# Patient Record
Sex: MALE | Race: WHITE | NOT HISPANIC OR LATINO | Employment: PART TIME | URBAN - NONMETROPOLITAN AREA
[De-identification: names, ages, dates, MRNs, and addresses within clinical notes are randomized per-mention and may not be internally consistent; named-entity substitution may affect disease eponyms.]

---

## 2017-08-10 DIAGNOSIS — J31.0 CHRONIC RHINITIS: ICD-10-CM

## 2017-08-10 DIAGNOSIS — J30.89 PERENNIAL ALLERGIC RHINITIS WITH SEASONAL VARIATION: ICD-10-CM

## 2017-08-10 DIAGNOSIS — J33.9 NASAL POLYP: ICD-10-CM

## 2017-08-10 DIAGNOSIS — J30.2 PERENNIAL ALLERGIC RHINITIS WITH SEASONAL VARIATION: ICD-10-CM

## 2017-08-10 DIAGNOSIS — J30.2 SEASONAL ALLERGIC RHINITIS: ICD-10-CM

## 2017-08-11 RX ORDER — FLUTICASONE PROPIONATE 50 MCG
SPRAY, SUSPENSION (ML) NASAL
Qty: 16 G | Refills: 1 | Status: SHIPPED | OUTPATIENT
Start: 2017-08-11 | End: 2017-09-22

## 2017-09-22 ENCOUNTER — OFFICE VISIT (OUTPATIENT)
Dept: OTOLARYNGOLOGY | Facility: OTHER | Age: 67
End: 2017-09-22
Attending: PHYSICIAN ASSISTANT
Payer: COMMERCIAL

## 2017-09-22 VITALS
TEMPERATURE: 97.8 F | SYSTOLIC BLOOD PRESSURE: 122 MMHG | HEART RATE: 62 BPM | OXYGEN SATURATION: 95 % | BODY MASS INDEX: 23.62 KG/M2 | DIASTOLIC BLOOD PRESSURE: 68 MMHG | WEIGHT: 165 LBS | HEIGHT: 70 IN

## 2017-09-22 DIAGNOSIS — J30.2 PERENNIAL ALLERGIC RHINITIS WITH SEASONAL VARIATION: ICD-10-CM

## 2017-09-22 DIAGNOSIS — J32.9 CHRONIC SINUSITIS, UNSPECIFIED LOCATION: ICD-10-CM

## 2017-09-22 DIAGNOSIS — R05.9 COUGH: ICD-10-CM

## 2017-09-22 DIAGNOSIS — J30.89 PERENNIAL ALLERGIC RHINITIS WITH SEASONAL VARIATION: ICD-10-CM

## 2017-09-22 DIAGNOSIS — J33.9 NASAL POLYP: Primary | ICD-10-CM

## 2017-09-22 DIAGNOSIS — J31.0 CHRONIC RHINITIS, UNSPECIFIED TYPE: ICD-10-CM

## 2017-09-22 DIAGNOSIS — Z98.890 S/P FESS (FUNCTIONAL ENDOSCOPIC SINUS SURGERY): ICD-10-CM

## 2017-09-22 DIAGNOSIS — J30.2 SEASONAL ALLERGIC RHINITIS, UNSPECIFIED CHRONICITY, UNSPECIFIED TRIGGER: ICD-10-CM

## 2017-09-22 PROCEDURE — 99213 OFFICE O/P EST LOW 20 MIN: CPT | Mod: 25 | Performed by: PHYSICIAN ASSISTANT

## 2017-09-22 PROCEDURE — 31231 NASAL ENDOSCOPY DX: CPT | Performed by: PHYSICIAN ASSISTANT

## 2017-09-22 RX ORDER — BUDESONIDE 0.5 MG/2ML
INHALANT ORAL
Qty: 1 BOX | Refills: 1 | Status: SHIPPED | OUTPATIENT
Start: 2017-09-22 | End: 2018-03-12

## 2017-09-22 RX ORDER — FLUTICASONE PROPIONATE 50 MCG
SPRAY, SUSPENSION (ML) NASAL
Qty: 16 G | Refills: 11 | Status: SHIPPED | OUTPATIENT
Start: 2017-09-22 | End: 2018-10-15

## 2017-09-22 RX ORDER — OLOPATADINE HYDROCHLORIDE 665 UG/1
2 SPRAY NASAL EVERY MORNING
Qty: 1 BOTTLE | Refills: 11 | Status: SHIPPED | OUTPATIENT
Start: 2017-09-22 | End: 2018-10-17

## 2017-09-22 ASSESSMENT — PAIN SCALES - GENERAL: PAINLEVEL: NO PAIN (0)

## 2017-09-22 NOTE — PROGRESS NOTES
Chief Complaint   Patient presents with     RECHECK     Nasal Polyp     Patient presents for follow up. He was last seen last fall by Dr. Wells for CRS, nasal polyps, and seasonal/ perennial allergic rhinitis.     Maco was doing well. Tolerated in office polyp removal, right ethmoid. He feels it some irritation.   He has noticed post nasal drip, drainage. He does have coughing fits, related to allergies. This was initially brought him to ENT at the start SLIT therapy.   He has been using lubricating eye drops w/ good relief. He has noticed less irritation.     He has been using Jey med rinse as directed, Flonase, Singulair and Xyzal.     He did d/c Qnasl due to eczema concerns.   He does follow dermatology and considered increasing Xyzal.   History of polypectomy and sinus surgery times 3.     He does have reflux and uses Prilosec PRN.   He las dose was >1 month ago. No dysphagia. No chronic throat clearing. He had some raspy voice concerns. Drinks water daily. No caffeine intake.    Past Medical History:   Diagnosis Date     Clotting disorder (H)     Factor V Leiden, history of DVT and PE     Uncomplicated asthma         Allergies   Allergen Reactions     Aspirin      Food      Apples, nectarines......throat     Current Outpatient Prescriptions   Medication     fluticasone (FLONASE) 50 MCG/ACT spray     montelukast (SINGULAIR) 10 MG tablet     levocetirizine (XYZAL) 5 MG tablet     fluocinonide (LIDEX) 0.05 % cream     Beclomethasone Dipropionate (QNASL) 80 MCG/ACT AERS Nasal Cement     SUBLINGUAL IMMUNOTHERAPY, SLIT,     acetaminophen (TYLENOL) 325 MG tablet     warfarin (COUMADIN) 5 MG tablet     NONFORMULARY     cholecalciferol (VITAMIN D3) 1000 UNIT tablet     albuterol (ALBUTEROL) 108 (90 BASE) MCG/ACT inhaler     Multiple Vitamin (DAILY MULTIVITAMIN PO)     Lutein 6 MG CAPS     Triamcinolone Acetonide (KENALOG EX)     fluticasone (FLOVENT HFA) 220 MCG/ACT inhaler     Flaxseed, Linseed, (FLAXSEED OIL)  "1000 MG CAPS     fish oil-omega-3 fatty acids (FISH OIL) 1000 MG capsule     EPINEPHrine (EPIPEN) 0.3 MG/0.3ML injection     Desoximetasone 0.05 % CREA     No current facility-administered medications for this visit.       ROS: 10 point ROS neg other than the symptoms noted above in the HPI.  /68  Pulse 62  Temp 97.8  F (36.6  C) (Tympanic)  Ht 5' 10\" (1.778 m)  Wt 165 lb (74.8 kg)  SpO2 95%  BMI 23.68 kg/m2  General - The patient is well nourished and well developed, and appears to have good nutritional status.  Alert and oriented to person and place, interactive.  Head and Face - Normocephalic and atraumatic, with no gross asymmetry noted of the contour of the facial features.  The facial nerve is intact, with strong symmetric movements.  Neck-no palpable lymphadenopathy or thyroid mass.  Trachea is midline.  Eyes - Extraocular movements intact.   Ears- External auditory canals are patent, tympanic membranes are intact without effusion or worrisome retractions   Nose - Nasal mucosa is pink and moist with no abnormal mucus.  The septum was  non-obstructive, turbinates of normal size and position.  No polyps, masses, or purulence noted on examination.  Mouth - Examination of the oral cavity shows pink, healthy, moist mucosa.  No lesions or ulceration noted.  The dentition are in good repair.  The tongue is mobile and midline.  Throat - The walls of the oropharynx were smooth, pink, moist, symmetric, and had no lesions or ulcerations.  The tonsillar pillars and soft palate were symmetric.  The uvula was midline on elevation.       To evaluate the nose in the postoperative state I performed rigid nasal endoscopy.  I first sprayed with lidocaine and neosynephrine.  I began with the LEFT side using a 2.7mm 30 degree rigid nasal endoscope.  The middle meatus was open, and I was able to pass the scope through.  The LEFT maxillary antrostomy is open, and looking down and into the LEFT maxillary sinus, the " mucosa looks healthy, no abnormal secretions.  Going further back, the ethmoid roof is nicely re-mucosalized, and there is no abnormal secretions.  Left polypoid change.      The right side was then examined.  The middle meatus was open and I visualized the RIGHT antrostomy was open.  Looking down into the RIGHT maxillary sinus, the mucosa was flat and healthy in appearance.  Going further back the ethmoid system looks good aside from polypoid change, mild to medial MT. No significant polyp.     ASSESSMENT:    ICD-10-CM    1. Nasal polyp J33.9 budesonide (PULMICORT) 0.5 MG/2ML neb solution     olopatadine (PATANASE) 0.6 % nasal spray     fluticasone (FLONASE) 50 MCG/ACT spray   2. Chronic sinusitis, unspecified location J32.9 budesonide (PULMICORT) 0.5 MG/2ML neb solution     olopatadine (PATANASE) 0.6 % nasal spray   3. S/P distant FESS (functional endoscopic sinus surgery) Z98.890 budesonide (PULMICORT) 0.5 MG/2ML neb solution     olopatadine (PATANASE) 0.6 % nasal spray   4. Chronic rhinitis, unspecified type J31.0 fluticasone (FLONASE) 50 MCG/ACT spray   5. Seasonal allergic rhinitis, unspecified chronicity, unspecified trigger J30.2 fluticasone (FLONASE) 50 MCG/ACT spray   6. Cough R05    7. Perennial allergic rhinitis with seasonal variation J30.89 fluticasone (FLONASE) 50 MCG/ACT spray    J30.2    Use high volume hortensia med saline irrigation. For 4 weeks use Budesonide vial in Hortensia Med rinse.    Use warm distilled water and 2 packets of the salt solution that comes with the bottle, dissolve in bottle up to 240 ml mercedez.  Irrigate each side of your nose leaning over the sink, using 1/3 to 1/2 the volume of the bottle in each nostril every irrigation.  Irrigate 5 times daily and as needed.         Use Hortensia Med w/ Budesonide solution for 4 weeks. (2 times a day )  Continue w/ Flonase use 2 sprays to each nostril daily in PM.   Start Patanase 2 sprays to each nostril in AM.     Continue with Singulair  May use Xyzal  daily or as needed  Consider repeat MQT  Follow LPR precautions.   Consider flex scope if there is no improvement.     Kathy Perdue PA-C  ENT  [facility], [place]  [phone]

## 2017-09-22 NOTE — PATIENT INSTRUCTIONS
Use high volume hortensia med saline irrigation. For 4 weeks use Budesonide vial in Hortensia Med rinse.    Use warm distilled water and 2 packets of the salt solution that comes with the bottle, dissolve in bottle up to 240 ml mercedez.  Irrigate each side of your nose leaning over the sink, using 1/3 to 1/2 the volume of the bottle in each nostril every irrigation.  Irrigate 5 times daily and as needed.         Use Hortensia Med w/ Budesonide solution for 4 weeks. (2 times a day )  Continue w/ Flonase use 2 sprays to each nostril daily in PM.   Start Patanase 2 sprays to each nostril in AM.     Continue with Singulair  May use Xyzal daily or as needed    If there are concerns or questions, Call [phone] and ask for nurse    Adult lifestyle changes to prevent LPR reviewed      Avoid eating and drinking within two to three hours prior to bedtime    Do not drink alcohol    Eat small meals and slowly    Limit problem foods:    o Caffeine  o Carbonated drinks  o Chocolate  o Peppermint  o Tomato  o Citrus fruits  o Fatty and fried foods      Lose weight    Quit smoking    Wear loose clothing

## 2017-09-22 NOTE — NURSING NOTE
"Chief Complaint   Patient presents with     RECHECK     Nasal Polyp       Initial /68  Pulse 62  Temp 97.8  F (36.6  C) (Tympanic)  Ht 5' 10\" (1.778 m)  Wt 165 lb (74.8 kg)  SpO2 95%  BMI 23.68 kg/m2 Estimated body mass index is 23.68 kg/(m^2) as calculated from the following:    Height as of this encounter: 5' 10\" (1.778 m).    Weight as of this encounter: 165 lb (74.8 kg).  Medication Reconciliation: complete     Danii Merritt      "

## 2017-09-22 NOTE — MR AVS SNAPSHOT
After Visit Summary   9/22/2017    Maco Benavidez    MRN: [id number]           Patient Information     Date Of Birth          1950        Visit Information        Provider Department      9/22/2017 2:00 PM Kathy Perdue PA-C [facility]        Today's Diagnoses     Nasal polyp    -  1    Chronic sinusitis, unspecified location        S/P distant FESS (functional endoscopic sinus surgery)        Chronic rhinitis, unspecified type        Seasonal allergic rhinitis, unspecified chronicity, unspecified trigger        Cough        Perennial allergic rhinitis with seasonal variation          Care Instructions    Use high volume hortensia med saline irrigation. For 4 weeks use Budesonide vial in Hortensia Med rinse.    Use warm distilled water and 2 packets of the salt solution that comes with the bottle, dissolve in bottle up to 240 ml mercedez.  Irrigate each side of your nose leaning over the sink, using 1/3 to 1/2 the volume of the bottle in each nostril every irrigation.  Irrigate 5 times daily and as needed.         Use Hortensia Med w/ Budesonide solution for 4 weeks. (2 times a day )  Continue w/ Flonase use 2 sprays to each nostril daily in PM.   Start Patanase 2 sprays to each nostril in AM.     Continue with Singulair  May use Xyzal daily or as needed    If there are concerns or questions, Call [phone] and ask for nurse    Adult lifestyle changes to prevent LPR reviewed      Avoid eating and drinking within two to three hours prior to bedtime    Do not drink alcohol    Eat small meals and slowly    Limit problem foods:    o Caffeine  o Carbonated drinks  o Chocolate  o Peppermint  o Tomato  o Citrus fruits  o Fatty and fried foods      Lose weight    Quit smoking    Wear loose clothing            Follow-ups after your visit        Follow-up notes from your care team     Return in about 5 weeks (around 10/27/2017).      Who to contact     If you have questions or need follow up information about  "today's clinic visit or your schedule please contact [facility] directly at [phone].  Normal or non-critical lab and imaging results will be communicated to you by MyChart, letter or phone within 4 business days after the clinic has received the results. If you do not hear from us within 7 days, please contact the clinic through MyChart or phone. If you have a critical or abnormal lab result, we will notify you by phone as soon as possible.  Submit refill requests through [employer] or call your pharmacy and they will forward the refill request to us. Please allow 3 business days for your refill to be completed.          Additional Information About Your Visit        MyChart Information     [employer] lets you send messages to your doctor, view your test results, renew your prescriptions, schedule appointments and more. To sign up, go to [email or web address] . Click on \"Log in\" on the left side of the screen, which will take you to the Welcome page. Then click on \"Sign up Now\" on the right side of the page.     You will be asked to enter the access code listed below, as well as some personal information. Please follow the directions to create your username and password.     Your access code is: [email or web address]  Expires: 2017  2:23 PM     Your access code will  in 90 days. If you need help or a new code, please call your [place] clinic or [phone].        Care EveryWhere ID     This is your Care EveryWhere ID. This could be used by other organizations to access your [place] medical records  [id number]        Your Vitals Were     Pulse Temperature Height Pulse Oximetry BMI (Body Mass Index)       62 97.8  F (36.6  C) (Tympanic) 5' 10\" (1.778 m) 95% 23.68 kg/m2        Blood Pressure from Last 3 Encounters:   17 122/68   10/17/16 127/68   16 138/62    Weight from Last 3 Encounters:   17 165 lb (74.8 kg)   10/17/16 165 lb (74.8 kg)   16 168 lb (76.2 kg)            "   Today, you had the following     No orders found for display         Today's Medication Changes          These changes are accurate as of: 9/22/17  2:23 PM.  If you have any questions, ask your nurse or doctor.               Start taking these medicines.        Dose/Directions    budesonide 0.5 MG/2ML neb solution   Commonly known as:  PULMICORT   Used for:  Nasal polyp, S/P FESS (functional endoscopic sinus surgery), Chronic sinusitis, unspecified location   Started by:  Kathy Perdue PA-C        Make 240 cc Jey med sinus irrigation Mix 2 ml vial of budesonide 0.5 mg Rinse BID for 4 weeks   Quantity:  1 Box   Refills:  1       olopatadine 0.6 % nasal spray   Commonly known as:  PATANASE   Used for:  Nasal polyp, Chronic sinusitis, unspecified location, S/P FESS (functional endoscopic sinus surgery)   Started by:  Kathy Perdue PA-C        Dose:  2 spray   Spray 2 sprays into both nostrils every morning   Quantity:  1 Bottle   Refills:  11         These medicines have changed or have updated prescriptions.        Dose/Directions    fluticasone 50 MCG/ACT spray   Commonly known as:  FLONASE   This may have changed:  See the new instructions.   Used for:  Seasonal allergic rhinitis, unspecified chronicity, unspecified trigger, Perennial allergic rhinitis with seasonal variation, Chronic rhinitis, unspecified type, Nasal polyp   Changed by:  Kathy Perdue PA-C        2 SPRAYS TO EACH NOSTRIL DAILY   Quantity:  16 g   Refills:  11            Where to get your medicines      These medications were sent to [facility] [address] [address]     Phone:  [phone]     budesonide 0.5 MG/2ML neb solution    fluticasone 50 MCG/ACT spray    olopatadine 0.6 % nasal spray                Primary Care Provider Office Phone # Fax #    Jc Reardon [phone]       [address] [address]        Equal Access to Services      EPHRAIM PINEDA : Hadii aad ku shanthi Carroll, waaxda luqadaha, qaybta kaalmada dayana, emmanuelle dimple haytodd tomlinsoncarolynrayna mary . So [facility] [phone].    ATENCIÓN: Si booker washburn, tiene a alvarado disposición servicios gratuitos de asistencia lingüística. Llame al [phone].    We comply with applicable federal civil rights laws and [place] laws. We do not discriminate on the basis of race, color, national origin, age, disability sex, sexual orientation or gender identity.            Thank you!     Thank you for choosing [facility]  for your [facility]. Our goal is always to provide you with excellent care. Hearing back from our patients is one way we can continue to improve our services. Please take a few minutes to complete the written survey that you may receive in the mail after your visit with us. Thank you!             Your Updated Medication List - Protect others around you: Learn how to safely use, store and throw away your medicines at www.disposemymeds.org.          This list is accurate as of: 9/22/17  2:23 PM.  Always use your most recent med list.                   Brand Name Dispense Instructions for use Diagnosis    acetaminophen 325 MG tablet    TYLENOL    100 tablet    Take 2 tablets (650 mg) by mouth every 4 hours as needed for mild pain or fever    Cellulitis       Beclomethasone Dipropionate 80 MCG/ACT Aers Nasal Spray    QNASL    17 g    Spray 2 sprays into both nostrils 2 times daily    Nasal polyp, Perennial allergic rhinitis with seasonal variation, Chronic rhinitis, Allergic conjunctivitis of both eyes       budesonide 0.5 MG/2ML neb solution    PULMICORT    1 Box    Make 240 cc Jey med sinus irrigation Mix 2 ml vial of budesonide 0.5 mg Rinse BID for 4 weeks    Nasal polyp, S/P FESS (functional endoscopic sinus surgery), Chronic sinusitis, unspecified location       cholecalciferol 1000 UNIT tablet    vitamin D     Take 1 tablet by mouth daily.        COUMADIN 5 MG tablet    Generic drug:  warfarin     30 tablet    Take 1 tablet (5 mg) by mouth daily [facility]        DAILY MULTIVITAMIN PO      Take 1 tablet by mouth daily with food.        Desoximetasone 0.05 % Crea      Apply  topically 2 times daily. (a thin layer to the affected area(s); rub in gently and completely)        EPIPEN 0.3 MG/0.3ML injection   Generic drug:  EPINEPHrine      Inject 0.3 mg into the muscle once as needed.        fish oil-omega-3 fatty acids 1000 MG capsule      Take 3 capsules by mouth daily.        flaxseed oil 1000 MG Caps      Take 1 capsule by mouth 3 times daily.        FLOVENT  MCG/ACT Inhaler   Generic drug:  fluticasone      Take 1 puff by mouth 2 times daily.        fluocinonide 0.05 % cream    LIDEX     Apply topically 2 times daily Apply two times daily to eczema patches on hands and body. Not to face.        fluticasone 50 MCG/ACT spray    FLONASE    16 g    2 SPRAYS TO EACH NOSTRIL DAILY    Seasonal allergic rhinitis, unspecified chronicity, unspecified trigger, Perennial allergic rhinitis with seasonal variation, Chronic rhinitis, unspecified type, Nasal polyp       KENALOG EX      as needed.        levocetirizine 5 MG tablet    XYZAL    30 tablet    TAKE 1 TABLET DAILY IN THE EVENING.    Seasonal allergic rhinitis, Perennial allergic rhinitis with seasonal variation, Chronic rhinitis, Allergic conjunctivitis of both eyes       Lutein 6 MG Caps      Take 1 capsule by mouth daily.        montelukast 10 MG tablet    SINGULAIR    30 tablet    TAKE ONE TABLET AT BEDTIME.    Seasonal allergic rhinitis, Perennial allergic rhinitis with seasonal variation, Chronic rhinitis, Allergic conjunctivitis of both eyes       NONFORMULARY      Allergy drops 1 drop under tongue three times a day.        olopatadine 0.6 % nasal spray    PATANASE    1 Bottle    Spray 2 sprays into both nostrils every morning    Nasal polyp, Chronic sinusitis, unspecified location, S/P FESS (functional  endoscopic sinus surgery)       PROAIR  (90 BASE) MCG/ACT Inhaler   Generic drug:  albuterol      Inhale 1 puff into the lungs as needed        SUBLINGUAL IMMUNOTHERAPY (SLIT)     1 Bottle    Continue with SLIT.  Maintain TID dosing.    Seasonal allergic rhinitis

## 2017-11-03 ENCOUNTER — OFFICE VISIT (OUTPATIENT)
Dept: OTOLARYNGOLOGY | Facility: OTHER | Age: 67
End: 2017-11-03
Attending: PHYSICIAN ASSISTANT
Payer: COMMERCIAL

## 2017-11-03 VITALS
WEIGHT: 160 LBS | DIASTOLIC BLOOD PRESSURE: 74 MMHG | SYSTOLIC BLOOD PRESSURE: 126 MMHG | OXYGEN SATURATION: 91 % | TEMPERATURE: 97.4 F | BODY MASS INDEX: 22.9 KG/M2 | HEART RATE: 64 BPM | HEIGHT: 70 IN

## 2017-11-03 DIAGNOSIS — J32.9 CHRONIC SINUSITIS, UNSPECIFIED LOCATION: ICD-10-CM

## 2017-11-03 DIAGNOSIS — K13.6 IRRITATION OF ORAL CAVITY: ICD-10-CM

## 2017-11-03 DIAGNOSIS — J33.9 NASAL POLYP: Primary | ICD-10-CM

## 2017-11-03 DIAGNOSIS — R05.9 COUGH: ICD-10-CM

## 2017-11-03 DIAGNOSIS — Z98.890 S/P FESS (FUNCTIONAL ENDOSCOPIC SINUS SURGERY): ICD-10-CM

## 2017-11-03 DIAGNOSIS — J20.9 ACUTE BRONCHITIS, UNSPECIFIED ORGANISM: ICD-10-CM

## 2017-11-03 DIAGNOSIS — J45.909 UNCOMPLICATED ASTHMA, UNSPECIFIED ASTHMA SEVERITY, UNSPECIFIED WHETHER PERSISTENT: ICD-10-CM

## 2017-11-03 PROCEDURE — 99213 OFFICE O/P EST LOW 20 MIN: CPT | Mod: 25 | Performed by: PHYSICIAN ASSISTANT

## 2017-11-03 PROCEDURE — 31575 DIAGNOSTIC LARYNGOSCOPY: CPT | Performed by: PHYSICIAN ASSISTANT

## 2017-11-03 RX ORDER — LEVOFLOXACIN 500 MG/1
500 TABLET, FILM COATED ORAL DAILY
Qty: 10 TABLET | Refills: 0 | Status: SHIPPED | OUTPATIENT
Start: 2017-11-03 | End: 2018-03-12

## 2017-11-03 RX ORDER — CEPHALEXIN 500 MG/1
500 CAPSULE ORAL 3 TIMES DAILY
Qty: 30 CAPSULE | Refills: 0 | Status: SHIPPED | OUTPATIENT
Start: 2017-11-03 | End: 2017-11-03 | Stop reason: ALTCHOICE

## 2017-11-03 ASSESSMENT — PAIN SCALES - GENERAL: PAINLEVEL: NO PAIN (0)

## 2017-11-03 NOTE — NURSING NOTE
"Chief Complaint   Patient presents with     RECHECK     Follow Up Nasal Polyp, Chronic Sinusitis, Seasonal Allergic Rhinitis, Cough, Perennial Allergic Rhintitis       Initial /74 (BP Location: Left arm, Patient Position: Sitting, Cuff Size: Adult Regular)  Pulse 64  Temp 97.4  F (36.3  C) (Tympanic)  Ht 5' 10\" (1.778 m)  Wt 160 lb (72.6 kg)  SpO2 91%  BMI 22.96 kg/m2 Estimated body mass index is 22.96 kg/(m^2) as calculated from the following:    Height as of this encounter: 5' 10\" (1.778 m).    Weight as of this encounter: 160 lb (72.6 kg).  Medication Reconciliation: leatha Parish      "

## 2017-11-03 NOTE — PROGRESS NOTES
Chief Complaint   Patient presents with     RECHECK     Follow Up Nasal Polyp, Chronic Sinusitis, Seasonal Allergic Rhinitis, Cough, Perennial Allergic Rhintitis     Maco returns for recheck from his last visit. His sinuses are feeling better, and notes less pressure. However, drainage remains and may have worsened. He had felt cough improved after his last visit, but returned. He now feels increase in cough, chest congestion, generally not feeling well, fatigued. He does have asthma and uses his Flovent, Albuterol as directed. Recent increase use of Albuterol.   Maco feels he is not able to expel his secretions or cough remove debris.   He has slight throat irritation as well.     He completed Budesonide rinse and returned to use of Jey Med.     Past Medical History:   Diagnosis Date     Clotting disorder (H)     Factor V Leiden, history of DVT and PE     Uncomplicated asthma         Allergies   Allergen Reactions     Aspirin      Food      Apples, nectarines......throat     Current Outpatient Prescriptions   Medication     budesonide (PULMICORT) 0.5 MG/2ML neb solution     olopatadine (PATANASE) 0.6 % nasal spray     fluticasone (FLONASE) 50 MCG/ACT spray     montelukast (SINGULAIR) 10 MG tablet     levocetirizine (XYZAL) 5 MG tablet     fluocinonide (LIDEX) 0.05 % cream     acetaminophen (TYLENOL) 325 MG tablet     warfarin (COUMADIN) 5 MG tablet     NONFORMULARY     cholecalciferol (VITAMIN D3) 1000 UNIT tablet     albuterol (ALBUTEROL) 108 (90 BASE) MCG/ACT inhaler     Multiple Vitamin (DAILY MULTIVITAMIN PO)     Lutein 6 MG CAPS     Triamcinolone Acetonide (KENALOG EX)     fluticasone (FLOVENT HFA) 220 MCG/ACT inhaler     Flaxseed, Linseed, (FLAXSEED OIL) 1000 MG CAPS     fish oil-omega-3 fatty acids (FISH OIL) 1000 MG capsule     EPINEPHrine (EPIPEN) 0.3 MG/0.3ML injection     Desoximetasone 0.05 % CREA     No current facility-administered medications for this visit.       ROS: 10 point ROS neg other than  "the symptoms noted above in the HPI.  /74 (BP Location: Left arm, Patient Position: Sitting, Cuff Size: Adult Regular)  Pulse 64  Temp 97.4  F (36.3  C) (Tympanic)  Ht 5' 10\" (1.778 m)  Wt 160 lb (72.6 kg)  SpO2 91%  BMI 22.96 kg/m2  General - The patient is well nourished and well developed, and appears to have good nutritional status.  Alert and oriented to person and place, interactive.  Head and Face - Normocephalic and atraumatic, with no gross asymmetry noted of the contour of the facial features.  The facial nerve is intact, with strong symmetric movements.  Neck-no palpable lymphadenopathy or thyroid mass.  Trachea is midline.  Eyes - Extraocular movements intact.   Ears- External auditory canals are patent, tympanic membranes are intact without effusion or worrisome retractions   Nose - Nasal mucosa is pink and moist with no abnormal mucus.  The septum was  non-obstructive, turbinates of normal size and position.  No polyps, masses, or purulence noted on examination.  Mouth - Examination of the oral cavity shows pink, healthy, moist mucosa.  No lesions or ulceration noted.  The dentition are in good repair.  The tongue is mobile and midline.  Throat - The walls of the oropharynx were smooth, pink, moist, symmetric, and had no lesions or ulcerations.  The tonsillar pillars and soft palate were symmetric.  The uvula was midline on elevation.  Left tonsillar fossa, posterior pharynx with white debris. (c/w use of inhalers)  Heart- Regular  Lungs- Right with scattered rales, expiratory wheeze. Left w/ expiratory wheeze.       To evaluate the nose in the postoperative state I performed rigid nasal endoscopy.  I first sprayed with lidocaine and neosynephrine.  I began with the LEFT side using a 2.7mm 30 degree rigid nasal endoscope.  The middle meatus was open, and I was able to pass the scope through.  The LEFT maxillary antrostomy is open, and looking down and into the LEFT maxillary sinus, the " mucosa looks healthy, no abnormal secretions.  Going further back, the ethmoid roof is nicely re-mucosalized, and there is no abnormal secretions.  Left polypoid change.       The right side was then examined.  The middle meatus was open and I visualized the RIGHT antrostomy was open.  Looking down into the RIGHT maxillary sinus, the mucosa was flat and healthy in appearance.  Going further back the ethmoid system looks good aside from polypoid change, mild to medial MT. No significant polyp.     Sinuses overall looks better.       Flexible Endoscopy -     Attempts at mirror laryngoscopy were not possible due to gag reflex.  Therefore I proceeded with a fiberoptic examination.  First I sprayed both sides of the nose with a mixture of lidocaine and neosynephrine.  I then passed the scope through the nasal cavity.   The nasal cavity was unremarkable.  The nasopharynx was mucosally covered and symmetric.  The Eustachian tube openings were unobstructed.  Going further down I had a clear view of the base of tongue which had normal appearing lingual tonsillar tissue.  The base of tongue was free of lesions, and the vallecula was open.  The epiglottis was smooth and mucosally covered.  The supraglottic larynx was then clearly visualized.  The vocal cords moved smoothly and symmetrically, they were pearly white and no lesions were seen.  The pyriform sinuses were open, and the limited view of the postcricoid region did not show any lesions.      ASSESSMENT:    ICD-10-CM    1. Nasal polyp J33.9    2. Chronic sinusitis, unspecified location J32.9    3. S/P distant FESS (functional endoscopic sinus surgery) Z98.890    4. Cough R05 levofloxacin (LEVAQUIN) 500 MG tablet     DISCONTINUED: cephALEXin (KEFLEX) 500 MG capsule   5. Uncomplicated asthma, unspecified asthma severity, unspecified whether persistent J45.909    6. Acute bronchitis, unspecified organism J20.9 levofloxacin (LEVAQUIN) 500 MG tablet     DISCONTINUED:  cephALEXin (KEFLEX) 500 MG capsule   7. Irritation of oral cavity K13.6 MAGIC MOUTHWASH, ENTER INGREDIENTS IN COMMENTS,         Patient does have concerns for developing bronchitis. Start Levaquin as directed. He will notify [facility] as he was started on Levaquin.   He verbalizes understanding.     Follow up w/ PCP for recheck of asthma/ bronchitis.     His sinuses are improving, which could be a cause of his increased drainage/ mucous.     Use warm distilled water and 2 packets of the salt solution that comes with the bottle, dissolve in bottle up to 240 ml mercedez.  Irrigate each side of your nose leaning over the sink, using 1/3 to 1/2 the volume of the bottle in each nostril every irrigation.  Irrigate 5 times daily and as needed.  Continue with patanase, Flonase   Continue with Singulair  May use Xyzal daily or as needed  Continue with SLIT  Consider repeat MQT.       Follow LPR precautions.   Start Magic mouthwash as directed.   Gargle after inhaler use  Nurse will contact Maco, he may need to start po Prilosec daily      Adult lifestyle changes to prevent LPR reviewed      Avoid eating and drinking within two to three hours prior to bedtime    Do not drink alcohol    Eat small meals and slowly    Limit problem foods:    o Caffeine  o Carbonated drinks  o Chocolate  o Peppermint  o Tomato  o Citrus fruits  o Fatty and fried foods      Lose weight    Quit smoking    Wear loose clothing    He agrees with this plan.     Kathy Perdue PA-C  [facility], [place]  [phone]

## 2017-11-03 NOTE — MR AVS SNAPSHOT
After Visit Summary   11/3/2017    Maco Benavidez    MRN: [id number]           Patient Information     Date Of Birth          1950        Visit Information        Provider Department      11/3/2017 9:45 AM Kathy Perdue PA-C [facility]        Today's Diagnoses     Nasal polyp    -  1    Chronic sinusitis, unspecified location        S/P distant FESS (functional endoscopic sinus surgery)        Cough        Uncomplicated asthma, unspecified asthma severity, unspecified whether persistent        Acute bronchitis, unspecified organism        Irritation of oral cavity          Care Instructions    Use warm distilled water and 2 packets of the salt solution that comes with the bottle, dissolve in bottle up to 240 ml mercedez.  Irrigate each side of your nose leaning over the sink, using 1/3 to 1/2 the volume of the bottle in each nostril every irrigation.  Irrigate 5 times daily and as needed.  Continue with patanase, Flonase   Continue with Singulair  May use Xyzal daily or as needed  Consider repeat MQT.   Follow LPR precautions.   Start Magic mouthwash as directed. Gargle after inhaler use  Start Levaquin 500 mg one tablet daily for 10 days. Contact coumadin clinic and notify starting antibiotic.     If there are concerns or questions, Call [phone] and ask for nurse     Adult lifestyle changes to prevent LPR reviewed      Avoid eating and drinking within two to three hours prior to bedtime    Do not drink alcohol    Eat small meals and slowly    Limit problem foods:    o Caffeine  o Carbonated drinks  o Chocolate  o Peppermint  o Tomato  o Citrus fruits  o Fatty and fried foods      Lose weight    Quit smoking    Wear loose clothing            Follow-ups after your visit        Who to contact     If you have questions or need follow up information about today's clinic visit or your schedule please contact [facility] directly at [phone].  Normal or non-critical lab and  "imaging results will be communicated to you by MyChart, letter or phone within 4 business days after the clinic has received the results. If you do not hear from us within 7 days, please contact the clinic through [employer] or phone. If you have a critical or abnormal lab result, we will notify you by phone as soon as possible.  Submit refill requests through [employer] or call your pharmacy and they will forward the refill request to us. Please allow 3 business days for your refill to be completed.          Additional Information About Your Visit        [employer] Information     PrintToPeer lets you send messages to your doctor, view your test results, renew your prescriptions, schedule appointments and more. To sign up, go to [email or web address] . Click on \"Log in\" on the left side of the screen, which will take you to the Welcome page. Then click on \"Sign up Now\" on the right side of the page.     You will be asked to enter the access code listed below, as well as some personal information. Please follow the directions to create your username and password.     Your access code is: [email or web address]  Expires: 2017  2:23 PM     Your access code will  in 90 days. If you need help or a new code, please call your [facility] clinic or [phone].        Care EveryWhere ID     This is your Care EveryWhere ID. This could be used by other organizations to access your [facility] medical records  [id number]        Your Vitals Were     Pulse Temperature Height Pulse Oximetry BMI (Body Mass Index)       64 97.4  F (36.3  C) (Tympanic) 5' 10\" (1.778 m) 91% 22.96 kg/m2        Blood Pressure from Last 3 Encounters:   17 126/74   17 122/68   10/17/16 127/68    Weight from Last 3 Encounters:   17 160 lb (72.6 kg)   17 165 lb (74.8 kg)   10/17/16 165 lb (74.8 kg)              Today, you had the following     No orders found for display         Today's Medication Changes          These changes are accurate as " of: 11/3/17 10:17 AM.  If you have any questions, ask your nurse or doctor.               Start taking these medicines.        Dose/Directions    levofloxacin 500 MG tablet   Commonly known as:  LEVAQUIN   Used for:  Acute bronchitis, unspecified organism, Cough   Started by:  Kathy Perdue PA-C        Dose:  500 mg   Take 1 tablet (500 mg) by mouth daily   Quantity:  10 tablet   Refills:  0       MAGIC MOUTHWASH (ENTER INGREDIENTS IN COMMENTS)   Used for:  Irritation of oral cavity   Started by:  Kathy Perdue PA-C        Dose:  10 mL   Take 10 mLs by mouth every 4 hours as needed   Quantity:  240 mL   Refills:  1            Where to get your medicines      These medications were sent to [facility] [place] [id number] [address]     Phone:  [phone]     levofloxacin 500 MG tablet         Some of these will need a paper prescription and others can be bought over the counter.  Ask your nurse if you have questions.     Bring a paper prescription for each of these medications     MAGIC MOUTHWASH (ENTER INGREDIENTS IN COMMENTS)                Primary Care Provider Office Phone # Fax #    Jc Reardon [phone]       [facility] [address] [place] [address]        Equal Access to Services     EPHRAIM PINEDA AH: Hadii mick mcarthur hadasho Soomaali, waaxda luqadaha, qaybta kaalmada adeegyada, emmanuelle chase. So [facility] [phone].    ATENCIÓN: Si habla español, tiene a alvarado disposición servicios gratuitos de asistencia lingüística. Llame al [phone].    We comply with applicable federal civil rights laws and [place] laws. We do not discriminate on the basis of race, color, national origin, age, disability, sex, sexual orientation, or gender identity.            Thank you!     Thank you for choosing [facility]  for your care. Our goal is always to provide you with excellent care. Hearing back from  our patients is one way we can continue to improve our services. Please take a few minutes to complete the written survey that you may receive in the mail after your visit with us. Thank you!             Your Updated Medication List - Protect others around you: Learn how to safely use, store and throw away your medicines at www.disposemymeds.org.          This list is accurate as of: 11/3/17 10:17 AM.  Always use your most recent med list.                   Brand Name Dispense Instructions for use Diagnosis    acetaminophen 325 MG tablet    TYLENOL    100 tablet    Take 2 tablets (650 mg) by mouth every 4 hours as needed for mild pain or fever    Cellulitis       budesonide 0.5 MG/2ML neb solution    PULMICORT    1 Box    Make 240 cc Jey med sinus irrigation Mix 2 ml vial of budesonide 0.5 mg Rinse BID for 4 weeks    Nasal polyp, S/P FESS (functional endoscopic sinus surgery), Chronic sinusitis, unspecified location       cholecalciferol 1000 UNIT tablet    vitamin D3     Take 1 tablet by mouth daily.        COUMADIN 5 MG tablet   Generic drug:  warfarin     30 tablet    Take 1 tablet (5 mg) by mouth daily Per [facility]mad[facility] Clinic        DAILY MULTIVITAMIN PO      Take 1 tablet by mouth daily with food.        Desoximetasone 0.05 % Crea      Apply  topically 2 times daily. (a thin layer to the affected area(s); rub in gently and completely)        EPIPEN 0.3 MG/0.3ML injection   Generic drug:  EPINEPHrine      Inject 0.3 mg into the muscle once as needed.        fish oil-omega-3 fatty acids 1000 MG capsule      Take 3 capsules by mouth daily.        flaxseed oil 1000 MG Caps      Take 1 capsule by mouth 3 times daily.        FLOVENT  MCG/ACT Inhaler   Generic drug:  fluticasone      Take 1 puff by mouth 2 times daily.        fluocinonide 0.05 % cream    LIDEX     Apply topically 2 times daily Apply two times daily to eczema patches on hands and body. Not to face.        fluticasone 50 MCG/ACT spray    FLONASE     16 g    2 SPRAYS TO EACH NOSTRIL DAILY    Seasonal allergic rhinitis, unspecified chronicity, unspecified trigger, Perennial allergic rhinitis with seasonal variation, Chronic rhinitis, unspecified type, Nasal polyp       KENALOG EX      as needed.        levocetirizine 5 MG tablet    XYZAL    30 tablet    TAKE 1 TABLET DAILY IN THE EVENING.    Seasonal allergic rhinitis, Perennial allergic rhinitis with seasonal variation, Chronic rhinitis, Allergic conjunctivitis of both eyes       levofloxacin 500 MG tablet    LEVAQUIN    10 tablet    Take 1 tablet (500 mg) by mouth daily    Acute bronchitis, unspecified organism, Cough       Lutein 6 MG Caps      Take 1 capsule by mouth daily.        MAGIC MOUTHWASH (ENTER INGREDIENTS IN COMMENTS)     240 mL    Take 10 mLs by mouth every 4 hours as needed    Irritation of oral cavity       montelukast 10 MG tablet    SINGULAIR    30 tablet    TAKE ONE TABLET AT BEDTIME.    Seasonal allergic rhinitis, Perennial allergic rhinitis with seasonal variation, Chronic rhinitis, Allergic conjunctivitis of both eyes       NONFORMULARY      Allergy drops 1 drop under tongue three times a day.        olopatadine 0.6 % nasal spray    PATANASE    1 Bottle    Spray 2 sprays into both nostrils every morning    Nasal polyp, Chronic sinusitis, unspecified location, S/P FESS (functional endoscopic sinus surgery)       PROAIR  (90 BASE) MCG/ACT Inhaler   Generic drug:  albuterol      Inhale 1 puff into the lungs as needed

## 2017-11-03 NOTE — PATIENT INSTRUCTIONS
Use warm distilled water and 2 packets of the salt solution that comes with the bottle, dissolve in bottle up to 240 ml mercedez.  Irrigate each side of your nose leaning over the sink, using 1/3 to 1/2 the volume of the bottle in each nostril every irrigation.  Irrigate 5 times daily and as needed.  Continue with patanase, Flonase   Continue with Singulair  May use Xyzal daily or as needed  Consider repeat MQT.   Follow LPR precautions.   Start Magic mouthwash as directed. Gargle after inhaler use  Start Levaquin 500 mg one tablet daily for 10 days. Contact coumadin clinic and notify starting antibiotic.     If there are concerns or questions, Call [phone] and ask for nurse     Adult lifestyle changes to prevent LPR reviewed      Avoid eating and drinking within two to three hours prior to bedtime    Do not drink alcohol    Eat small meals and slowly    Limit problem foods:    o Caffeine  o Carbonated drinks  o Chocolate  o Peppermint  o Tomato  o Citrus fruits  o Fatty and fried foods      Lose weight    Quit smoking    Wear loose clothing

## 2017-11-16 DIAGNOSIS — J30.2 PERENNIAL ALLERGIC RHINITIS WITH SEASONAL VARIATION: ICD-10-CM

## 2017-11-16 DIAGNOSIS — J31.0 CHRONIC RHINITIS: ICD-10-CM

## 2017-11-16 DIAGNOSIS — H10.13 ALLERGIC CONJUNCTIVITIS OF BOTH EYES: ICD-10-CM

## 2017-11-16 DIAGNOSIS — J30.89 PERENNIAL ALLERGIC RHINITIS WITH SEASONAL VARIATION: ICD-10-CM

## 2017-11-16 DIAGNOSIS — J30.2 SEASONAL ALLERGIC RHINITIS: ICD-10-CM

## 2017-11-17 RX ORDER — MONTELUKAST SODIUM 10 MG/1
TABLET ORAL
Qty: 30 TABLET | Refills: 10 | Status: SHIPPED | OUTPATIENT
Start: 2017-11-17 | End: 2018-11-06

## 2017-11-17 RX ORDER — LEVOCETIRIZINE DIHYDROCHLORIDE 5 MG/1
TABLET, FILM COATED ORAL
Qty: 30 TABLET | Refills: 10 | Status: SHIPPED | OUTPATIENT
Start: 2017-11-17 | End: 2018-11-06

## 2018-02-20 ENCOUNTER — TRANSFERRED RECORDS (OUTPATIENT)
Dept: HEALTH INFORMATION MANAGEMENT | Facility: HOSPITAL | Age: 68
End: 2018-02-20

## 2018-03-12 ENCOUNTER — HOSPITAL ENCOUNTER (OUTPATIENT)
Facility: HOSPITAL | Age: 68
End: 2018-03-12
Attending: FAMILY MEDICINE | Admitting: FAMILY MEDICINE
Payer: COMMERCIAL

## 2018-03-13 ENCOUNTER — OFFICE VISIT (OUTPATIENT)
Dept: OTOLARYNGOLOGY | Facility: OTHER | Age: 68
End: 2018-03-13
Attending: PHYSICIAN ASSISTANT
Payer: COMMERCIAL

## 2018-03-13 ENCOUNTER — RADIANT APPOINTMENT (OUTPATIENT)
Dept: GENERAL RADIOLOGY | Facility: OTHER | Age: 68
End: 2018-03-13
Attending: PHYSICIAN ASSISTANT
Payer: COMMERCIAL

## 2018-03-13 VITALS
DIASTOLIC BLOOD PRESSURE: 60 MMHG | BODY MASS INDEX: 23.68 KG/M2 | OXYGEN SATURATION: 95 % | WEIGHT: 165 LBS | TEMPERATURE: 98 F | SYSTOLIC BLOOD PRESSURE: 112 MMHG | HEART RATE: 82 BPM

## 2018-03-13 DIAGNOSIS — J45.40 MODERATE PERSISTENT ASTHMA, UNSPECIFIED WHETHER COMPLICATED: ICD-10-CM

## 2018-03-13 DIAGNOSIS — J32.4 CHRONIC PANSINUSITIS: ICD-10-CM

## 2018-03-13 DIAGNOSIS — J33.9 NASAL POLYP: ICD-10-CM

## 2018-03-13 DIAGNOSIS — R05.9 COUGH: Primary | ICD-10-CM

## 2018-03-13 DIAGNOSIS — J30.2 CHRONIC SEASONAL ALLERGIC RHINITIS, UNSPECIFIED TRIGGER: ICD-10-CM

## 2018-03-13 DIAGNOSIS — J31.0 CHRONIC RHINITIS, UNSPECIFIED TYPE: ICD-10-CM

## 2018-03-13 DIAGNOSIS — R05.9 COUGH: ICD-10-CM

## 2018-03-13 DIAGNOSIS — J30.2 PERENNIAL ALLERGIC RHINITIS WITH SEASONAL VARIATION: ICD-10-CM

## 2018-03-13 DIAGNOSIS — J30.89 PERENNIAL ALLERGIC RHINITIS WITH SEASONAL VARIATION: ICD-10-CM

## 2018-03-13 LAB
GRAM STN SPEC: ABNORMAL
GRAM STN SPEC: ABNORMAL
SPECIMEN SOURCE: ABNORMAL

## 2018-03-13 PROCEDURE — 87070 CULTURE OTHR SPECIMN AEROBIC: CPT | Performed by: PHYSICIAN ASSISTANT

## 2018-03-13 PROCEDURE — 99214 OFFICE O/P EST MOD 30 MIN: CPT | Mod: 25 | Performed by: PHYSICIAN ASSISTANT

## 2018-03-13 PROCEDURE — 31231 NASAL ENDOSCOPY DX: CPT | Performed by: PHYSICIAN ASSISTANT

## 2018-03-13 PROCEDURE — 99000 SPECIMEN HANDLING OFFICE-LAB: CPT | Performed by: PHYSICIAN ASSISTANT

## 2018-03-13 PROCEDURE — 71046 X-RAY EXAM CHEST 2 VIEWS: CPT | Mod: TC

## 2018-03-13 PROCEDURE — 87077 CULTURE AEROBIC IDENTIFY: CPT | Mod: 59 | Performed by: PHYSICIAN ASSISTANT

## 2018-03-13 PROCEDURE — 87205 SMEAR GRAM STAIN: CPT | Performed by: PHYSICIAN ASSISTANT

## 2018-03-13 PROCEDURE — 87184 SC STD DISK METHOD PER PLATE: CPT | Performed by: PHYSICIAN ASSISTANT

## 2018-03-13 PROCEDURE — 87186 SC STD MICRODIL/AGAR DIL: CPT | Mod: 59 | Performed by: PHYSICIAN ASSISTANT

## 2018-03-13 PROCEDURE — 87102 FUNGUS ISOLATION CULTURE: CPT | Mod: 90 | Performed by: PHYSICIAN ASSISTANT

## 2018-03-13 RX ORDER — BUDESONIDE 0.5 MG/2ML
INHALANT ORAL
Qty: 3 BOX | Refills: 1 | Status: SHIPPED | OUTPATIENT
Start: 2018-03-13 | End: 2018-04-17

## 2018-03-13 RX ORDER — IPRATROPIUM BROMIDE AND ALBUTEROL SULFATE 2.5; .5 MG/3ML; MG/3ML
1 SOLUTION RESPIRATORY (INHALATION) EVERY 6 HOURS PRN
Qty: 30 VIAL | Refills: 1 | Status: SHIPPED | OUTPATIENT
Start: 2018-03-13 | End: 2018-12-06

## 2018-03-13 ASSESSMENT — PAIN SCALES - GENERAL: PAINLEVEL: MILD PAIN (2)

## 2018-03-13 NOTE — PATIENT INSTRUCTIONS
Start Budesonide rinses. Rinse two times daily.   Continue with Nasacort, Patanase.     Return for allergy testing in 2 weeks.   Duo Nebs treatment. Use as needed for cough/ wheeze      Thank you for allowing HARMAN Medrano and our ENT team to participate in your care.  If your medications are too expensive, please give the nurse a call.  We can possibly change this medication.  If you have a scheduling or an appointment question please contact [facility] Coordinator at their direct line [phone].   ALL nursing questions or concerns can be directed to your ENT nurse at: [phone] [place]

## 2018-03-13 NOTE — PROGRESS NOTES
Chief Complaint   Patient presents with     RECHECK     nasal polyp, chronic sinusitis.  Patient c/o issues with post nasal drip     RECHECK     SLIT     Maco returns for recheck. He has been doing well. Reports his sinuses are with mild congestion, rhinitis, post nasal drip. He feels symptoms are intermittent, worse the last week.   He has been using Jey med rinses daily. He does use Patanase and Flonase and uses generally.   Currently using Xyzal and Singulair daily with fair control.   He has completed SLIT. He completed last vial about 1 year ago.     No significant cycle for sinus/ allergies. He has noticed increase in symptoms during the last week.   No recent abx use. He has no other concerns. Saw Dr. Reardon and he had noted continued wheezing in his lungs and was referred back to ENT. Colonoscopy is Friday.     He has tried using inhalers in past, but has not been at this time. No recent prednisone. He has productive cough. No pulmonary consult.     Past Medical History:   Diagnosis Date     Clotting disorder (H)     Factor V Leiden, history of DVT and PE     Uncomplicated asthma         Allergies   Allergen Reactions     Aspirin      Food      Apples, nectarines......throat     Other [Seasonal Allergies]      Inhalant in type environmental     Current Outpatient Prescriptions   Medication     montelukast (SINGULAIR) 10 MG tablet     levocetirizine (XYZAL) 5 MG tablet     olopatadine (PATANASE) 0.6 % nasal spray     fluticasone (FLONASE) 50 MCG/ACT spray     fluocinonide (LIDEX) 0.05 % cream     acetaminophen (TYLENOL) 325 MG tablet     warfarin (COUMADIN) 5 MG tablet     NONFORMULARY     cholecalciferol (VITAMIN D3) 1000 UNIT tablet     albuterol (ALBUTEROL) 108 (90 BASE) MCG/ACT inhaler     Multiple Vitamin (DAILY MULTIVITAMIN PO)     Lutein 6 MG CAPS     Triamcinolone Acetonide (KENALOG EX)     fluticasone (FLOVENT HFA) 220 MCG/ACT inhaler     Flaxseed, Linseed, (FLAXSEED OIL) 1000 MG CAPS     fish  oil-omega-3 fatty acids (FISH OIL) 1000 MG capsule     EPINEPHrine (EPIPEN) 0.3 MG/0.3ML injection     Desoximetasone 0.05 % CREA     No current facility-administered medications for this visit.       ROS: 10 point ROS neg other than the symptoms noted above in the HPI.  /60 (BP Location: Right arm, Patient Position: Sitting, Cuff Size: Adult Regular)  Pulse 82  Temp 98  F (36.7  C) (Tympanic)  Wt 165 lb (74.8 kg)  SpO2 95%  BMI 23.68 kg/m2      General - The patient is well nourished and well developed, and appears to have good nutritional status.  Alert and oriented to person and place, interactive.  Head and Face - Normocephalic and atraumatic, with no gross asymmetry noted of the contour of the facial features.  The facial nerve is intact, with strong symmetric movements.  Neck-no palpable lymphadenopathy or thyroid mass.  Trachea is midline.  Eyes - Extraocular movements intact.   Ears- External auditory canals are patent, tympanic membranes are intact without effusion or worrisome retractions   Nose - Nasal mucosa is pink and moist with no abnormal mucus.  The septum was  non-obstructive, turbinates of normal size and position.  No polyps, masses, or purulence noted on examination.  Mouth - Examination of the oral cavity shows pink, healthy, moist mucosa.  No lesions or ulceration noted.  The dentition are in good repair.  The tongue is mobile and midline.  Throat - The walls of the oropharynx were smooth, pink, moist, symmetric, and had no lesions or ulcerations.  The tonsillar pillars and soft palate were symmetric.  The uvula was midline on elevation.  Left tonsillar fossa, posterior pharynx with white debris. Suspected for inhaler use. However, this was present at his last visit upon review. May need scraping/ bs if remaining at his next check.   Heart- Regular rate  Lungs- Expiratory wheeze. Patient has multiple coughing fits in offices.   To evaluate the nose in the postoperative state I  performed rigid nasal endoscopy.  I first sprayed with lidocaine and neosynephrine.  I began with the LEFT side using a 2.7mm 30 degree rigid nasal endoscope.  The middle meatus was open, and I was able to pass the scope through.  The LEFT maxillary antrostomy is open, and looking down and into the LEFT maxillary sinus, the mucosa looks healthy, no abnormal secretions.  Going further back, the ethmoid roof is nicely re-mucosalized, and there are abnormal secretions.  Left polypoid change.       The right side was then examined.  The middle meatus was open and I visualized the RIGHT antrostomy was open.  Looking down into the RIGHT maxillary sinus, the mucosa was flat and healthy in appearance.  Going further back the ethmoid system looks good aside from polypoid change, mild to medial MT.     Sinus drainage throughout. Polypoid changes. No obstructing polyp.       ASSESSMENT:    ICD-10-CM    1. Cough R05 XR Chest 2 Views     Nebulizer with Compressor     ipratropium - albuterol 0.5 mg/2.5 mg/3 mL (DUONEB) 0.5-2.5 (3) MG/3ML neb solution   2. Chronic pansinusitis J32.4 Sinus Culture Aerobic Bacterial     Fungus Culture, non-blood     Gram stain     budesonide (PULMICORT) 0.5 MG/2ML neb solution   3. Chronic seasonal allergic rhinitis, unspecified trigger J30.2    4. Perennial allergic rhinitis with seasonal variation J30.89     J30.2    5. Chronic rhinitis, unspecified type J31.0    6. Nasal polyp J33.9    7. Moderate persistent asthma, unspecified whether complicated J45.40      Start Budesonide rinses. Rinse two times daily.   Continue with Nasacort, Patanase.     Return for allergy testing in 2 weeks.   Duo Nebs treatment. Use as needed for cough/ wheeze    Sinus Culture obtained today. Will await results.   Consider pulmonary referral. He does have ASA senstivity/ nasal polyps.   CXR negative.     Indications for allergy testing include:   1) Confirm suspicion of allergic rhinitis due to inhalant allergies  2)  Identify the offending allergen to determine specific mode of treatment  3) In the case of chronic rhinosinusitis: when symptoms are not controlled by avoidance and pharmacotherapy  4) In the Asthma patient when exacerbations may be due to perennial allergen exposure  5) Suspect food allergy  6) Otitis Media, chronic rhinitis, atopic dermatitis, Meniere disease, headache, pharyngitis or eye symptoms    Due to cough, allergies, modified quantitative testing (MQT) will be performed.  Signed consent was obtained, and the risks of immunotherapy were discussed, including the potential for anaphylaxis.    If immunotherapy (IT) is recommended, there is continued risk of anaphylaxis.   Anaphylaxis can cause death. The patient will need to be monitored for 30 minutes post injection.  They must present their epinephrine pen prior to injection.  Subcutaneous as well as sublingual immunotherapy (SLIT) were discussed as potential treatment options.  The patient was told SLIT is not approved by the FDA and is cash pay.  The general time frame of immunotherapy was discussed (generally 3-5 years, sometimes longer), and the basic immunology behind IT was discussed.    Kathy Perdue PA-C  [facility], [place]  [phone]

## 2018-03-13 NOTE — MR AVS SNAPSHOT
After Visit Summary   3/13/2018    Maco Benavidez    MRN: [id number]           Patient Information     Date Of Birth          1950        Visit Information        Provider Department      3/13/2018 1:15 PM Kathy Perdue PA-C [facility]        Today's Diagnoses     Cough    -  1    Chronic pansinusitis          Care Instructions    Start Budesonide rinses. Rinse two times daily.   Continue with Nasacort, Patanase.     Return for allergy testing in 2 weeks.   Duo Nebs treatment. Use as needed for cough/ wheeze      Thank you for allowing HARMAN Medrano and our ENT team to participate in your care.  If your medications are too expensive, please give the nurse a call.  We can possibly change this medication.  If you have a scheduling or an appointment question please contact [facility] Coordinator at their direct line [phone].   ALL nursing questions or concerns can be directed to your ENT nurse at: [phone] [place]              Follow-ups after your visit        Your next 10 appointments already scheduled     Mar 16, 2018   Procedure with Jc Reardon MD   [facility] ([address] [id number] [phone]              Future tests that were ordered for you today     Open Future Orders        Priority Expected Expires Ordered    XR Chest 2 Views Routine 3/13/2018 3/13/2019 3/13/2018            Who to contact     If you have questions or need follow up information about today's clinic visit or your schedule please contact [facility] directly at [phone].  Normal or non-critical lab and imaging results will be communicated to you by MyChart, letter or phone within 4 business days after the clinic has received the results. If you do not hear from us within 7 days, please contact the clinic through MyChart or phone. If you have a critical or abnormal lab result, we will notify you by phone as soon  "as possible.  Submit refill requests through "[employer]" or call your pharmacy and they will forward the refill request to us. Please allow 3 business days for your refill to be completed.          Additional Information About Your Visit        [employer] Information     "[employer]" lets you send messages to your doctor, view your test results, renew your prescriptions, schedule appointments and more. To sign up, go to [email or web address]" . Click on \"Log in\" on the left side of the screen, which will take you to the Welcome page. Then click on \"Sign up Now\" on the right side of the page.     You will be asked to enter the access code listed below, as well as some personal information. Please follow the directions to create your username and password.     Your access code is: [email or web address]  Expires: 2018  1:52 PM     Your access code will  in 90 days. If you need help or a new code, please call your [place] clinic or [phone].        Care EveryWhere ID     This is your Care EveryWhere ID. This could be used by other organizations to access your [place] medical records  [id number]        Your Vitals Were     Pulse Temperature Pulse Oximetry BMI (Body Mass Index)          82 98  F (36.7  C) (Tympanic) 95% 23.68 kg/m2         Blood Pressure from Last 3 Encounters:   18 112/60   17 126/74   17 122/68    Weight from Last 3 Encounters:   18 165 lb (74.8 kg)   17 160 lb (72.6 kg)   17 165 lb (74.8 kg)              We Performed the Following     Fungus Culture, non-blood     Gram stain     Nebulizer with Compressor     Sinus Culture Aerobic Bacterial          Today's Medication Changes          These changes are accurate as of 3/13/18  1:53 PM.  If you have any questions, ask your nurse or doctor.               Start taking these medicines.        Dose/Directions    budesonide 0.5 MG/2ML neb solution   Commonly known as:  PULMICORT   Used for:  Chronic pansinusitis   Started by: "  Kathy Perdue PA-C        Make 240 cc Jey med sinus irrigation Mix 2 ml vial of budesonide 0.5 mg Rinse BID for 4 weeks   Quantity:  3 Box   Refills:  1       ipratropium - albuterol 0.5 mg/2.5 mg/3 mL 0.5-2.5 (3) MG/3ML neb solution   Commonly known as:  DUONEB   Used for:  Cough   Started by:  Kathy Perdue PA-C        Dose:  1 vial   Take 1 vial (3 mLs) by nebulization every 6 hours as needed for shortness of breath / dyspnea or wheezing   Quantity:  30 vial   Refills:  1            Where to get your medicines      These medications were sent to [facility] [address] [address]     Phone:  [phone]     budesonide 0.5 MG/2ML neb solution    ipratropium - albuterol 0.5 mg/2.5 mg/3 mL 0.5-2.5 (3) MG/3ML neb solution                Primary Care Provider Office Phone # Fax #    Jc Reardon [phone]       [facility] [address] [place] [address]        Equal Access to Services     RAJENDRA PINEDA : Coy maki Sootilio, waaxda lutimothy, qaybta kaalmada adejess, emmanuelle chase. So [facility] [phone].    ATENCIÓN: Si habla español, tiene a alvarado disposición servicios gratuitos de asistencia lingüística. Llame al [phone].    We comply with applicable federal civil rights laws and [place] laws. We do not discriminate on the basis of race, color, national origin, age, disability, sex, sexual orientation, or gender identity.            Thank you!     Thank you for choosing [facility]  for your care. Our goal is always to provide you with excellent care. Hearing back from our patients is one way we can continue to improve our services. Please take a few minutes to complete the written survey that you may receive in the mail after your visit with us. Thank you!             Your Updated Medication List - Protect others around you: Learn how to safely use, store and throw  away your medicines at www.disposemymeds.org.          This list is accurate as of 3/13/18  1:53 PM.  Always use your most recent med list.                   Brand Name Dispense Instructions for use Diagnosis    acetaminophen 325 MG tablet    TYLENOL    100 tablet    Take 2 tablets (650 mg) by mouth every 4 hours as needed for mild pain or fever    Cellulitis       budesonide 0.5 MG/2ML neb solution    PULMICORT    3 Box    Make 240 cc Jey med sinus irrigation Mix 2 ml vial of budesonide 0.5 mg Rinse BID for 4 weeks    Chronic pansinusitis       cholecalciferol 1000 UNIT tablet    vitamin D3     Take 1 tablet by mouth daily.        COUMADIN 5 MG tablet   Generic drug:  warfarin     30 tablet    Take 1 tablet (5 mg) by mouth daily [facility]        DAILY MULTIVITAMIN PO      Take 1 tablet by mouth daily with food.        EPIPEN 0.3 MG/0.3ML injection   Generic drug:  EPINEPHrine      Inject 0.3 mg into the muscle once as needed.        fish oil-omega-3 fatty acids 1000 MG capsule      Take 3 capsules by mouth daily.        flaxseed oil 1000 MG Caps      Take 1 capsule by mouth 3 times daily.        FLOVENT  MCG/ACT Inhaler   Generic drug:  fluticasone      Take 1 puff by mouth 2 times daily.        fluocinonide 0.05 % cream    LIDEX     Apply topically 2 times daily Apply two times daily to eczema patches on hands and body. Not to face.        fluticasone 50 MCG/ACT spray    FLONASE    16 g    2 SPRAYS TO EACH NOSTRIL DAILY    Seasonal allergic rhinitis, unspecified chronicity, unspecified trigger, Perennial allergic rhinitis with seasonal variation, Chronic rhinitis, unspecified type, Nasal polyp       ipratropium - albuterol 0.5 mg/2.5 mg/3 mL 0.5-2.5 (3) MG/3ML neb solution    DUONEB    30 vial    Take 1 vial (3 mLs) by nebulization every 6 hours as needed for shortness of breath / dyspnea or wheezing    Cough       levocetirizine 5 MG tablet    XYZAL    30 tablet    TAKE 1 TABLET DAILY IN THE  EVENING.    Seasonal allergic rhinitis, Perennial allergic rhinitis with seasonal variation, Chronic rhinitis, Allergic conjunctivitis of both eyes       Lutein 6 MG Caps      Take 1 capsule by mouth daily.        montelukast 10 MG tablet    SINGULAIR    30 tablet    TAKE ONE TABLET AT BEDTIME.    Seasonal allergic rhinitis, Perennial allergic rhinitis with seasonal variation, Chronic rhinitis, Allergic conjunctivitis of both eyes       olopatadine 0.6 % nasal spray    PATANASE    1 Bottle    Spray 2 sprays into both nostrils every morning    Nasal polyp, Chronic sinusitis, unspecified location, S/P FESS (functional endoscopic sinus surgery)       PROAIR  (90 BASE) MCG/ACT Inhaler   Generic drug:  albuterol      Inhale 1 puff into the lungs as needed

## 2018-03-13 NOTE — LETTER
3/13/2018         RE: Maco Benavidez  [address]   [place] [address]        Dear Colleague,    [facility] Please see a copy of my visit note below.    Chief Complaint   Patient presents with     RECHECK     nasal polyp, chronic sinusitis.  Patient c/o issues with post nasal drip     RECHECK     SLIT     Maco returns for recheck. He has been doing well. Reports his sinuses are with mild congestion, rhinitis, post nasal drip. He feels symptoms are intermittent, worse the last week.   He has been using Jey med rinses daily. He does use Patanase and Flonase and uses generally.   Currently using Xyzal and Singulair daily with fair control.   He has completed SLIT. He completed last vial about 1 year ago.     No significant cycle for sinus/ allergies. He has noticed increase in symptoms during the last week.   No recent abx use. He has no other concerns. Saw Dr. Reardon and he had noted continued wheezing in his lungs and was referred back to ENT. Colonoscopy is Friday.     He has tried using inhalers in past, but has not been at this time. No recent prednisone. He has productive cough. No pulmonary consult.     Past Medical History:   Diagnosis Date     Clotting disorder (H)     Factor V Leiden, history of DVT and PE     Uncomplicated asthma         Allergies   Allergen Reactions     Aspirin      Food      Apples, nectarines......throat     Other [Seasonal Allergies]      Inhalant in type environmental     Current Outpatient Prescriptions   Medication     montelukast (SINGULAIR) 10 MG tablet     levocetirizine (XYZAL) 5 MG tablet     olopatadine (PATANASE) 0.6 % nasal spray     fluticasone (FLONASE) 50 MCG/ACT spray     fluocinonide (LIDEX) 0.05 % cream     acetaminophen (TYLENOL) 325 MG tablet     warfarin (COUMADIN) 5 MG tablet     NONFORMULARY     cholecalciferol (VITAMIN D3) 1000 UNIT tablet     albuterol (ALBUTEROL) 108 (90 BASE)  MCG/ACT inhaler     Multiple Vitamin (DAILY MULTIVITAMIN PO)     Lutein 6 MG CAPS     Triamcinolone Acetonide (KENALOG EX)     fluticasone (FLOVENT HFA) 220 MCG/ACT inhaler     Flaxseed, Linseed, (FLAXSEED OIL) 1000 MG CAPS     fish oil-omega-3 fatty acids (FISH OIL) 1000 MG capsule     EPINEPHrine (EPIPEN) 0.3 MG/0.3ML injection     Desoximetasone 0.05 % CREA     No current facility-administered medications for this visit.       ROS: 10 point ROS neg other than the symptoms noted above in the HPI.  /60 (BP Location: Right arm, Patient Position: Sitting, Cuff Size: Adult Regular)  Pulse 82  Temp 98  F (36.7  C) (Tympanic)  Wt 165 lb (74.8 kg)  SpO2 95%  BMI 23.68 kg/m2      General - The patient is well nourished and well developed, and appears to have good nutritional status.  Alert and oriented to person and place, interactive.  Head and Face - Normocephalic and atraumatic, with no gross asymmetry noted of the contour of the facial features.  The facial nerve is intact, with strong symmetric movements.  Neck-no palpable lymphadenopathy or thyroid mass.  Trachea is midline.  Eyes - Extraocular movements intact.   Ears- External auditory canals are patent, tympanic membranes are intact without effusion or worrisome retractions   Nose - Nasal mucosa is pink and moist with no abnormal mucus.  The septum was  non-obstructive, turbinates of normal size and position.  No polyps, masses, or purulence noted on examination.  Mouth - Examination of the oral cavity shows pink, healthy, moist mucosa.  No lesions or ulceration noted.  The dentition are in good repair.  The tongue is mobile and midline.  Throat - The walls of the oropharynx were smooth, pink, moist, symmetric, and had no lesions or ulcerations.  The tonsillar pillars and soft palate were symmetric.  The uvula was midline on elevation.  Left tonsillar fossa, posterior pharynx with white debris. Suspected for inhaler use. However, this was present at  his last visit upon review. May need scraping/ bs if remaining at his next check.   Heart- Regular rate  Lungs- Expiratory wheeze. Patient has multiple coughing fits in offices.   To evaluate the nose in the postoperative state I performed rigid nasal endoscopy.  I first sprayed with lidocaine and neosynephrine.  I began with the LEFT side using a 2.7mm 30 degree rigid nasal endoscope.  The middle meatus was open, and I was able to pass the scope through.  The LEFT maxillary antrostomy is open, and looking down and into the LEFT maxillary sinus, the mucosa looks healthy, no abnormal secretions.  Going further back, the ethmoid roof is nicely re-mucosalized, and there are abnormal secretions.  Left polypoid change.       The right side was then examined.  The middle meatus was open and I visualized the RIGHT antrostomy was open.  Looking down into the RIGHT maxillary sinus, the mucosa was flat and healthy in appearance.  Going further back the ethmoid system looks good aside from polypoid change, mild to medial MT.     Sinus drainage throughout. Polypoid changes. No obstructing polyp.       ASSESSMENT:    ICD-10-CM    1. Cough R05 XR Chest 2 Views     Nebulizer with Compressor     ipratropium - albuterol 0.5 mg/2.5 mg/3 mL (DUONEB) 0.5-2.5 (3) MG/3ML neb solution   2. Chronic pansinusitis J32.4 Sinus Culture Aerobic Bacterial     Fungus Culture, non-blood     Gram stain     budesonide (PULMICORT) 0.5 MG/2ML neb solution   3. Chronic seasonal allergic rhinitis, unspecified trigger J30.2    4. Perennial allergic rhinitis with seasonal variation J30.89     J30.2    5. Chronic rhinitis, unspecified type J31.0    6. Nasal polyp J33.9    7. Moderate persistent asthma, unspecified whether complicated J45.40      Start Budesonide rinses. Rinse two times daily.   Continue with Nasacort, Patanase.     Return for allergy testing in 2 weeks.   Duo Nebs treatment. Use as needed for cough/ wheeze    Sinus Culture obtained today.  Will await results.   Consider pulmonary referral. He does have ASA senstivity/ nasal polyps.   CXR negative.     Indications for allergy testing include:   1) Confirm suspicion of allergic rhinitis due to inhalant allergies  2) Identify the offending allergen to determine specific mode of treatment  3) In the case of chronic rhinosinusitis: when symptoms are not controlled by avoidance and pharmacotherapy  4) In the Asthma patient when exacerbations may be due to perennial allergen exposure  5) Suspect food allergy  6) Otitis Media, chronic rhinitis, atopic dermatitis, Meniere disease, headache, pharyngitis or eye symptoms    Due to cough, allergies, modified quantitative testing (MQT) will be performed.  Signed consent was obtained, and the risks of immunotherapy were discussed, including the potential for anaphylaxis.    If immunotherapy (IT) is recommended, there is continued risk of anaphylaxis.   Anaphylaxis can cause death. The patient will need to be monitored for 30 minutes post injection.  They must present their epinephrine pen prior to injection.  Subcutaneous as well as sublingual immunotherapy (SLIT) were discussed as potential treatment options.  The patient was told SLIT is not approved by the FDA and is cash pay.  The general time frame of immunotherapy was discussed (generally 3-5 years, sometimes longer), and the basic immunology behind IT was discussed.    Kathy Perdue PA-C  [facility], [place]  [phone]      Again, thank you for allowing me to participate in the care of your patient.        Sincerely,        Kathy Perdue PA-C

## 2018-03-13 NOTE — NURSING NOTE
"Chief Complaint   Patient presents with     RECHECK     nasal polyp, chronic sinusitis.  Patient c/o issues with post nasal drip     RECHECK     SLIT       Initial /60 (BP Location: Right arm, Patient Position: Sitting, Cuff Size: Adult Regular)  Pulse 82  Temp 98  F (36.7  C) (Tympanic)  Wt 165 lb (74.8 kg)  SpO2 95%  BMI 23.68 kg/m2 Estimated body mass index is 23.68 kg/(m^2) as calculated from the following:    Height as of 11/3/17: 5' 10\" (1.778 m).    Weight as of this encounter: 165 lb (74.8 kg).  Medication Reconciliation: complete     ELPIDIO TRINIDAD      "

## 2018-03-14 ENCOUNTER — TELEPHONE (OUTPATIENT)
Dept: ALLERGY | Facility: OTHER | Age: 68
End: 2018-03-14

## 2018-03-14 NOTE — TELEPHONE ENCOUNTER
I spoke with the patient today regarding allergy skin testing.    All general instructions are reviewed with the patient.      The patient is made aware of all medications that need to be held prior to testing, and will stop medications as directed per instructions.  The patient verbalized understanding and agree with plan.      Should the patient be given any additional medications prior to the day of testing, they are told to let the provider know that they are going to be allergy tested, so medications that need to be help prior to MQT are not prescribed.      An appointment will be arranged by the ENT [facility] for testing date and time. He will call back when he is ready to schedule.  He does not want to schedule at this time.      This message is forwarded to the [facility] to arrange for an appointment. Written instructions are mailed to the patient as well,  by the ENT [facility].  Mihaela Nelson RN

## 2018-03-14 NOTE — TELEPHONE ENCOUNTER
I tried to reach Maco to review instructions for MQT allergy skin testing, and arrange a date for the appointment.  He was not home. LM to call.  Mihaela Nelson RN

## 2018-03-14 NOTE — TELEPHONE ENCOUNTER
I tried to reach Maco to review instructions for MQT allergy skin testing, and arrange a date for the appointment.  NA/LM to call.  Mihaela Nelson RN

## 2018-03-16 LAB
BACTERIA SPEC CULT: ABNORMAL
BACTERIA SPEC CULT: ABNORMAL
SPECIMEN SOURCE: ABNORMAL

## 2018-03-16 RX ORDER — CLINDAMYCIN HCL 300 MG
300 CAPSULE ORAL 3 TIMES DAILY
Qty: 30 CAPSULE | Refills: 0 | Status: SHIPPED | OUTPATIENT
Start: 2018-03-16 | End: 2018-03-26

## 2018-04-11 LAB
FUNGUS SPEC CULT: NORMAL
SPECIMEN SOURCE: NORMAL

## 2018-04-17 ENCOUNTER — TELEPHONE (OUTPATIENT)
Dept: ALLERGY | Facility: OTHER | Age: 68
End: 2018-04-17

## 2018-04-17 ENCOUNTER — TELEPHONE (OUTPATIENT)
Dept: OTOLARYNGOLOGY | Facility: OTHER | Age: 68
End: 2018-04-17

## 2018-04-17 DIAGNOSIS — J32.4 CHRONIC PANSINUSITIS: ICD-10-CM

## 2018-04-17 RX ORDER — BUDESONIDE 0.5 MG/2ML
INHALANT ORAL
Qty: 3 BOX | Refills: 1 | Status: SHIPPED | OUTPATIENT
Start: 2018-04-17 | End: 2018-12-06

## 2018-04-17 NOTE — TELEPHONE ENCOUNTER
Maco called and scheduled his test for Thursday of this week.  He was to have scheduled earlier in March, but had date conflicts.  According to EPIC he is on an antihistamine (PO) and Patanase.  I tried to reach him today to see if he has held those medications.  He told the ENT huc that he had the instructions at home from earlier in march.  NA/LM for him to call.  Mihaela Nelson\

## 2018-04-17 NOTE — TELEPHONE ENCOUNTER
Pt called and states that he finished up on his Budesonide irrigation. He said that his cough went away while he was using this.  His cough has now come back.  It isn't as bad as it was before, but it is bothering him.  Please advise.  Pt uses 's at [facility].

## 2018-04-18 NOTE — TELEPHONE ENCOUNTER
I spoke with Maco.  He will reschedule his test for at least 3 weeks out.  I will forward this to the ENT [facility] to call him and reschedule.  Work # [phone]  Maco does have written instructions.  His MQT for tomorrow will be cancelled.    Mihaela Nelson

## 2018-05-03 ENCOUNTER — ANESTHESIA EVENT (OUTPATIENT)
Dept: SURGERY | Facility: HOSPITAL | Age: 68
End: 2018-05-03
Payer: COMMERCIAL

## 2018-05-03 NOTE — ANESTHESIA PREPROCEDURE EVALUATION
Anesthesia Evaluation     . Pt has had prior anesthetic.     No history of anesthetic complications          ROS/MED HX    ENT/Pulmonary:     (+)allergic rhinitis, Mild Persistent asthma Treatment: Inhaler daily,  , . .    Neurologic:       Cardiovascular:  - neg cardiovascular ROS       METS/Exercise Tolerance:     Hematologic:     (+) History of blood clots -      Musculoskeletal:  - neg musculoskeletal ROS       GI/Hepatic:     (+) GERD Symptomatic,       Renal/Genitourinary:  - ROS Renal section negative       Endo:  - neg endo ROS       Psychiatric:         Infectious Disease:  - neg infectious disease ROS       Malignancy:      - no malignancy   Other:    - neg other ROS                 Physical Exam  Normal systems: cardiovascular, pulmonary and dental    Airway   Mallampati: II  TM distance: >3 FB  Neck ROM: full    Dental     Cardiovascular   Rhythm and rate: regular and normal      Pulmonary    breath sounds clear to auscultation                    Anesthesia Plan      History & Physical Review      ASA Status:  2 .    NPO Status:  > 6 hours    Plan for MAC Maintenance will be TIVA.  Reason for MAC:  Difficulty with conscious sedation (QS) and Extreme anxiety (QS)    No H/P      Postoperative Care      Consents  Anesthetic plan, risks, benefits and alternatives discussed with:  Patient..                          .

## 2018-05-04 ENCOUNTER — ANESTHESIA (OUTPATIENT)
Dept: SURGERY | Facility: HOSPITAL | Age: 68
End: 2018-05-04
Payer: COMMERCIAL

## 2018-05-04 ENCOUNTER — HOSPITAL ENCOUNTER (OUTPATIENT)
Facility: HOSPITAL | Age: 68
Discharge: HOME | End: 2018-05-04
Attending: FAMILY MEDICINE | Admitting: FAMILY MEDICINE
Payer: COMMERCIAL

## 2018-05-04 ENCOUNTER — APPOINTMENT (OUTPATIENT)
Dept: LAB | Facility: HOSPITAL | Age: 68
End: 2018-05-04
Attending: FAMILY MEDICINE
Payer: COMMERCIAL

## 2018-05-04 VITALS
RESPIRATION RATE: 16 BRPM | DIASTOLIC BLOOD PRESSURE: 88 MMHG | OXYGEN SATURATION: 95 % | SYSTOLIC BLOOD PRESSURE: 137 MMHG

## 2018-05-04 LAB — INR PPP: 1.03 (ref 0.8–1.2)

## 2018-05-04 PROCEDURE — 85610 PROTHROMBIN TIME: CPT | Performed by: NURSE ANESTHETIST, CERTIFIED REGISTERED

## 2018-05-04 PROCEDURE — 01999 UNLISTED ANES PROCEDURE: CPT | Performed by: NURSE ANESTHETIST, CERTIFIED REGISTERED

## 2018-05-04 PROCEDURE — 36415 COLL VENOUS BLD VENIPUNCTURE: CPT | Performed by: NURSE ANESTHETIST, CERTIFIED REGISTERED

## 2018-05-04 PROCEDURE — 40000305 ZZH STATISTIC PRE PROC ASSESS I: Performed by: FAMILY MEDICINE

## 2018-05-04 PROCEDURE — 71000027 ZZH RECOVERY PHASE 2 EACH 15 MINS: Performed by: FAMILY MEDICINE

## 2018-05-04 PROCEDURE — 25000125 ZZHC RX 250: Performed by: NURSE ANESTHETIST, CERTIFIED REGISTERED

## 2018-05-04 PROCEDURE — 37000008 ZZH ANESTHESIA TECHNICAL FEE, 1ST 30 MIN: Performed by: FAMILY MEDICINE

## 2018-05-04 PROCEDURE — 36000050 ZZH SURGERY LEVEL 2 1ST 30 MIN: Performed by: FAMILY MEDICINE

## 2018-05-04 PROCEDURE — 25000128 H RX IP 250 OP 636: Performed by: NURSE ANESTHETIST, CERTIFIED REGISTERED

## 2018-05-04 RX ORDER — FENTANYL CITRATE 50 UG/ML
25-50 INJECTION, SOLUTION INTRAMUSCULAR; INTRAVENOUS
Status: DISCONTINUED | OUTPATIENT
Start: 2018-05-04 | End: 2018-05-04 | Stop reason: HOSPADM

## 2018-05-04 RX ORDER — MEPERIDINE HYDROCHLORIDE 25 MG/ML
12.5 INJECTION INTRAMUSCULAR; INTRAVENOUS; SUBCUTANEOUS
Status: DISCONTINUED | OUTPATIENT
Start: 2018-05-04 | End: 2018-05-04 | Stop reason: HOSPADM

## 2018-05-04 RX ORDER — SODIUM CHLORIDE, SODIUM LACTATE, POTASSIUM CHLORIDE, CALCIUM CHLORIDE 600; 310; 30; 20 MG/100ML; MG/100ML; MG/100ML; MG/100ML
INJECTION, SOLUTION INTRAVENOUS CONTINUOUS
Status: DISCONTINUED | OUTPATIENT
Start: 2018-05-04 | End: 2018-05-04 | Stop reason: HOSPADM

## 2018-05-04 RX ORDER — LIDOCAINE 40 MG/G
CREAM TOPICAL
Status: DISCONTINUED | OUTPATIENT
Start: 2018-05-04 | End: 2018-05-04 | Stop reason: HOSPADM

## 2018-05-04 RX ORDER — ONDANSETRON 2 MG/ML
4 INJECTION INTRAMUSCULAR; INTRAVENOUS EVERY 30 MIN PRN
Status: DISCONTINUED | OUTPATIENT
Start: 2018-05-04 | End: 2018-05-04 | Stop reason: HOSPADM

## 2018-05-04 RX ORDER — LABETALOL HYDROCHLORIDE 5 MG/ML
10 INJECTION, SOLUTION INTRAVENOUS
Status: DISCONTINUED | OUTPATIENT
Start: 2018-05-04 | End: 2018-05-04 | Stop reason: HOSPADM

## 2018-05-04 RX ORDER — FLUMAZENIL 0.1 MG/ML
0.2 INJECTION, SOLUTION INTRAVENOUS
Status: DISCONTINUED | OUTPATIENT
Start: 2018-05-04 | End: 2018-05-04 | Stop reason: HOSPADM

## 2018-05-04 RX ORDER — PROPOFOL 10 MG/ML
INJECTION, EMULSION INTRAVENOUS PRN
Status: DISCONTINUED | OUTPATIENT
Start: 2018-05-04 | End: 2018-05-04

## 2018-05-04 RX ORDER — NALOXONE HYDROCHLORIDE 0.4 MG/ML
.1-.4 INJECTION, SOLUTION INTRAMUSCULAR; INTRAVENOUS; SUBCUTANEOUS
Status: DISCONTINUED | OUTPATIENT
Start: 2018-05-04 | End: 2018-05-04 | Stop reason: HOSPADM

## 2018-05-04 RX ORDER — ALBUTEROL SULFATE 0.83 MG/ML
2.5 SOLUTION RESPIRATORY (INHALATION) EVERY 4 HOURS PRN
Status: DISCONTINUED | OUTPATIENT
Start: 2018-05-04 | End: 2018-05-04 | Stop reason: HOSPADM

## 2018-05-04 RX ORDER — LIDOCAINE HYDROCHLORIDE 20 MG/ML
INJECTION, SOLUTION INFILTRATION; PERINEURAL PRN
Status: DISCONTINUED | OUTPATIENT
Start: 2018-05-04 | End: 2018-05-04

## 2018-05-04 RX ORDER — ONDANSETRON 4 MG/1
4 TABLET, ORALLY DISINTEGRATING ORAL EVERY 30 MIN PRN
Status: DISCONTINUED | OUTPATIENT
Start: 2018-05-04 | End: 2018-05-04 | Stop reason: HOSPADM

## 2018-05-04 RX ADMIN — PROPOFOL 20 MG: 10 INJECTION, EMULSION INTRAVENOUS at 13:48

## 2018-05-04 RX ADMIN — PROPOFOL 20 MG: 10 INJECTION, EMULSION INTRAVENOUS at 13:50

## 2018-05-04 RX ADMIN — PROPOFOL 10 MG: 10 INJECTION, EMULSION INTRAVENOUS at 13:59

## 2018-05-04 RX ADMIN — PROPOFOL 20 MG: 10 INJECTION, EMULSION INTRAVENOUS at 13:44

## 2018-05-04 RX ADMIN — LIDOCAINE HYDROCHLORIDE 40 MG: 20 INJECTION, SOLUTION INFILTRATION; PERINEURAL at 13:44

## 2018-05-04 RX ADMIN — MIDAZOLAM 1 MG: 1 INJECTION INTRAMUSCULAR; INTRAVENOUS at 13:41

## 2018-05-04 RX ADMIN — PROPOFOL 10 MG: 10 INJECTION, EMULSION INTRAVENOUS at 13:57

## 2018-05-04 RX ADMIN — SODIUM CHLORIDE, POTASSIUM CHLORIDE, SODIUM LACTATE AND CALCIUM CHLORIDE: 600; 310; 30; 20 INJECTION, SOLUTION INTRAVENOUS at 11:47

## 2018-05-04 RX ADMIN — PROPOFOL 20 MG: 10 INJECTION, EMULSION INTRAVENOUS at 13:47

## 2018-05-04 RX ADMIN — PROPOFOL 10 MG: 10 INJECTION, EMULSION INTRAVENOUS at 13:54

## 2018-05-04 RX ADMIN — PROPOFOL 20 MG: 10 INJECTION, EMULSION INTRAVENOUS at 13:46

## 2018-05-04 RX ADMIN — PROPOFOL 10 MG: 10 INJECTION, EMULSION INTRAVENOUS at 14:02

## 2018-05-04 RX ADMIN — PROPOFOL 10 MG: 10 INJECTION, EMULSION INTRAVENOUS at 14:04

## 2018-05-04 NOTE — IP AVS SNAPSHOT
MRN:[id number]                      After Visit Summary   5/4/2018    Maco Benavidez    MRN: [id number]           Thank you!     Thank you for choosing [facility] for your care. Our goal is always to provide you with excellent care. Hearing back from our patients is one way we can continue to improve our services. Please take a few minutes to complete the written survey that you may receive in the mail after you visit with us. Thank you!        Patient Information     Date Of Birth          1950        About your hospital stay     You were admitted on:  May 4, 2018 You last received care in the:  [facility] Preop/Phase II    You were discharged on:  May 4, 2018       Who to Call     For medical emergencies, please call 911.  For non-urgent questions about your medical care, please call your primary care provider or clinic, [phone]  For questions related to your surgery, please call your surgery clinic        Attending Provider     Provider Specialty    Jc Reardon MD [facility]       Primary Care Provider Office Phone # Fax #    Jc Reardon [phone]      After Care Instructions     Discharge Instructions       Resume pre procedure diet            Discharge Instructions       Restart home medications.                  Your next 10 appointments already scheduled     May 11, 2018  8:30 AM CDT   Office Visit with HC ALLERGY TESTING   [facility] ([facility] )    [address] [place] [id number] [phone]           Bring a current list of meds and any records pertaining to this visit. For Physicals, please bring immunization records and any forms needing to be filled out. Please arrive 10 minutes early to complete paperwork.            May 11, 2018 10:45 AM CDT   (Arrive by 10:30 AM)   Return Visit with Kathy Perdue PA-C   [facility] ([facility] )    [address] [place] [address]    [phone]              Further instructions from your care team           INSTRUCTIONS AFTER COLONOSCOPY    WHEN YOU ARE BACK HOME:    Plan to rest for an hour or two after you get home.    You may have some cramping or pressure until you pass gas.    You may resume your regular medications.    Eat a small, light meal at first, and then gradually return to normal meal sizes.  If you had a polyp removed:    Slight bleeding may occur.  You may have a slight blood stain on the toilet paper after a bowel movement.    To lessen the chance of bleeding, avoid heavy exercise for ONE WEEK.  This includes heavy lifting, vigorous sport activities, and heavy physical labor.  You may resume your normal sexual activity.      Avoid aspirin or aspirin products if instructed by your doctor.    WHAT TO WATCH FOR:  Problems rarely occur after the exam; however, it is important for you to watch for early signs of possible problems.  If you have     Unusual pain in your abdomen    Nausea and vomiting that persists    Excessive bleeding    Black or bloody bowel movements    Fever or temperature above 100.6 F  Please call your doctor ([facility] [phone]) or go to the nearest hospital emergency room.    Post-Anesthesia Patient Instructions    IMMEDIATELY FOLLOWING SURGERY:  Do not drive or operate machinery for the first twenty four hours after surgery.  Do not make any important decisions for twenty four hours after surgery or while taking narcotic pain medications or sedatives.  If you develop intractable nausea and vomiting or a severe headache please notify your doctor immediately.    FOLLOW-UP:  Please make an appointment with your surgeon as instructed. You do not need to follow up with anesthesia unless specifically instructed to do so.    WOUND CARE INSTRUCTIONS (if applicable):  Keep a dry clean dressing on the anesthesia/puncture wound site if there is drainage.  Once the wound has quit draining you may leave it open  "to air.  Generally you should leave the bandage intact for twenty four hours unless there is drainage.  If the epidural site drains for more than 36-48 hours please call the anesthesia department.    QUESTIONS?:  Please feel free to call your physician or the hospital  if you have any questions, and they will be happy to assist you.       Pending Results     No orders found from 2018 to 2018.            Admission Information     Date & Time Provider Department Dept. Phone    2018 Jc Reardon MD HI Preop/Phase -[phone]      Your Vitals Were     Blood Pressure Respirations Pulse Oximetry             122/79 16 96%         MyChart Information     [employer] lets you send messages to your doctor, view your test results, renew your prescriptions, schedule appointments and more. To sign up, go to [email or web address] . Click on \"Log in\" on the left side of the screen, which will take you to the Welcome page. Then click on \"Sign up Now\" on the right side of the page.     You will be asked to enter the access code listed below, as well as some personal information. Please follow the directions to create your username and password.     Your access code is: [email or web address]  Expires: 2018  1:52 PM     Your access code will  in 90 days. If you need help or a new code, please call your [facility] clinic or [phone].        Care EveryWhere ID     This is your Care EveryWhere ID. This could be used by other organizations to access your [facility] medical records  [id number]        Equal Access to Services     [facility]: Hadii aad ku hadasho Soomaali, waaxda luqadaha, qaybta kaalmada adeegyada, emmanuelle mary . So [facility] [phone].    ATENCIÓN: Si habla español, tiene a alvarado disposición servicios gratuitos de asistencia lingüística. Llame al [phone].    We comply with applicable federal civil rights laws and [place] laws. We do not discriminate on the " basis of race, color, national origin, age, disability, sex, sexual orientation, or gender identity.               Review of your medicines      CONTINUE these medicines which have NOT CHANGED        Dose / Directions    acetaminophen 325 MG tablet   Commonly known as:  TYLENOL   Used for:  Cellulitis        Dose:  650 mg   Take 2 tablets (650 mg) by mouth every 4 hours as needed for mild pain or fever   Quantity:  100 tablet   Refills:  0       budesonide 0.5 MG/2ML neb solution   Commonly known as:  PULMICORT   Used for:  Chronic pansinusitis        Make 240 cc Jey med sinus irrigation Mix 2 ml vial of budesonide 0.5 mg Rinse daily for 1-2 months   Quantity:  3 Box   Refills:  1       cholecalciferol 1000 UNIT tablet   Commonly known as:  vitamin D3        Dose:  1 tablet   Take 1 tablet by mouth daily.   Refills:  0       COUMADIN 5 MG tablet   Indication:  5mg tabs takes one and half tabs daily   Generic drug:  warfarin        Dose:  5 mg   Take 1 tablet (5 mg) by mouth daily Per [facility]   Quantity:  30 tablet   Refills:  0       DAILY MULTIVITAMIN PO        Dose:  1 tablet   Take 1 tablet by mouth daily with food.   Refills:  0       EPIPEN 0.3 MG/0.3ML injection   Generic drug:  EPINEPHrine        Dose:  0.3 mg   Inject 0.3 mg into the muscle once as needed.   Refills:  0       fish oil-omega-3 fatty acids 1000 MG capsule        Dose:  3 capsule   Take 3 capsules by mouth daily.   Refills:  0       flaxseed oil 1000 MG Caps        Dose:  1 capsule   Take 1 capsule by mouth 3 times daily.   Refills:  0       FLOVENT  MCG/ACT Inhaler   Generic drug:  fluticasone        Dose:  1 puff   Take 1 puff by mouth 2 times daily.   Refills:  0       fluocinonide 0.05 % cream   Commonly known as:  LIDEX        Apply topically 2 times daily Apply two times daily to eczema patches on hands and body. Not to face.   Refills:  0       fluticasone 50 MCG/ACT spray   Commonly known as:  FLONASE   Used for:   Seasonal allergic rhinitis, unspecified chronicity, unspecified trigger, Perennial allergic rhinitis with seasonal variation, Chronic rhinitis, unspecified type, Nasal polyp        2 SPRAYS TO EACH NOSTRIL DAILY   Quantity:  16 g   Refills:  11       ipratropium - albuterol 0.5 mg/2.5 mg/3 mL 0.5-2.5 (3) MG/3ML neb solution   Commonly known as:  DUONEB   Used for:  Cough        Dose:  1 vial   Take 1 vial (3 mLs) by nebulization every 6 hours as needed for shortness of breath / dyspnea or wheezing   Quantity:  30 vial   Refills:  1       levocetirizine 5 MG tablet   Commonly known as:  XYZAL   Used for:  Seasonal allergic rhinitis, Perennial allergic rhinitis with seasonal variation, Chronic rhinitis, Allergic conjunctivitis of both eyes        TAKE 1 TABLET DAILY IN THE EVENING.   Quantity:  30 tablet   Refills:  10       Lutein 6 MG Caps        Dose:  1 capsule   Take 1 capsule by mouth daily.   Refills:  0       montelukast 10 MG tablet   Commonly known as:  SINGULAIR   Used for:  Seasonal allergic rhinitis, Perennial allergic rhinitis with seasonal variation, Chronic rhinitis, Allergic conjunctivitis of both eyes        TAKE ONE TABLET AT BEDTIME.   Quantity:  30 tablet   Refills:  10       olopatadine 0.6 % nasal spray   Commonly known as:  PATANASE   Used for:  Nasal polyp, Chronic sinusitis, unspecified location, S/P FESS (functional endoscopic sinus surgery)        Dose:  2 spray   Spray 2 sprays into both nostrils every morning   Quantity:  1 Bottle   Refills:  11       PROAIR  (90 Base) MCG/ACT Inhaler   Generic drug:  albuterol        Dose:  1 puff   Inhale 1 puff into the lungs as needed   Refills:  0                Protect others around you: Learn how to safely use, store and throw away your medicines at www.disposemymeds.org.             Medication List: This is a list of all your medications and when to take them. Check marks below indicate your daily home schedule. Keep this list as a  reference.      Medications           Morning Afternoon Evening Bedtime As Needed    acetaminophen 325 MG tablet   Commonly known as:  TYLENOL   Take 2 tablets (650 mg) by mouth every 4 hours as needed for mild pain or fever                                budesonide 0.5 MG/2ML neb solution   Commonly known as:  PULMICORT   Make 240 cc Jey med sinus irrigation Mix 2 ml vial of budesonide 0.5 mg Rinse daily for 1-2 months                                cholecalciferol 1000 UNIT tablet   Commonly known as:  vitamin D3   Take 1 tablet by mouth daily.                                COUMADIN 5 MG tablet   Take 1 tablet (5 mg) by mouth daily Per [facility]   Generic drug:  warfarin                                DAILY MULTIVITAMIN PO   Take 1 tablet by mouth daily with food.                                EPIPEN 0.3 MG/0.3ML injection   Inject 0.3 mg into the muscle once as needed.   Generic drug:  EPINEPHrine                                fish oil-omega-3 fatty acids 1000 MG capsule   Take 3 capsules by mouth daily.                                flaxseed oil 1000 MG Caps   Take 1 capsule by mouth 3 times daily.                                FLOVENT  MCG/ACT Inhaler   Take 1 puff by mouth 2 times daily.   Generic drug:  fluticasone                                fluocinonide 0.05 % cream   Commonly known as:  LIDEX   Apply topically 2 times daily Apply two times daily to eczema patches on hands and body. Not to face.                                fluticasone 50 MCG/ACT spray   Commonly known as:  FLONASE   2 SPRAYS TO EACH NOSTRIL DAILY                                ipratropium - albuterol 0.5 mg/2.5 mg/3 mL 0.5-2.5 (3) MG/3ML neb solution   Commonly known as:  DUONEB   Take 1 vial (3 mLs) by nebulization every 6 hours as needed for shortness of breath / dyspnea or wheezing                                levocetirizine 5 MG tablet   Commonly known as:  XYZAL   TAKE 1 TABLET DAILY IN THE EVENING.                                 Lutein 6 MG Caps   Take 1 capsule by mouth daily.                                montelukast 10 MG tablet   Commonly known as:  SINGULAIR   TAKE ONE TABLET AT BEDTIME.                                olopatadine 0.6 % nasal spray   Commonly known as:  PATANASE   Spray 2 sprays into both nostrils every morning                                PROAIR  (90 Base) MCG/ACT Inhaler   Inhale 1 puff into the lungs as needed   Generic drug:  albuterol

## 2018-05-04 NOTE — OP NOTE
[facility] Colonoscopy Operative Note     PROCEDURES:  Colonoscopy    PREOPERATIVE DIAGNOSIS:    1.  Colorectal cancer screening      POSTOPERATIVE DIAGNOSIS:    1.  Colorectal cancer screening   2.  Normal colon       ANESTHESIA:  MAC  ESTIMATED BLOOD LOSS: None  FLUIDS: See anesthesia record  COMPLICATIONS: None     DESCRIPTION OF PROCEDURE:  Maco Benavidez is a 68 year old male who presents for screening colonoscopy.  He denies changes to his bowel habits including melena, hematochezia, nausea, emesis, abdominal pain or unexplained weight loss.   He denies FHX of colon cancer.    On the day prior to the procedure the patient underwent Suprep with satisfactory evacuation.  On the day of the procedure the patient was brought back to the procedure room where he was placed in the left lateral recumbent position.  IV monitored anesthesia was initiated with Local with MAC.  Before beginning colonoscopy a rectal exam was performed and was Normal.      Following rectal exam colonoscope was introduced into the rectum and advanced toward the ileocecal junction with intermittent insufflation.  The cecum was reached and well visualized.  At this point the colonoscope was withdrawn ensuring adequate visualization of the lumen and bowel wall.  Findings were unremarkable throughout the ascending, transverse, descending and sigmoid colon.  Upon reaching the rectum the colonoscope was retroflexed and findings were Normal.  At this point the colonoscopy was straightened and withdrawn and the procedure was complete.      Overall the patient tolerated the procedure without difficulty.        Jc Reardon MD

## 2018-05-04 NOTE — CONSULTS
HPI: Maco Benavidez is a 67 year old male who presents for colonoscopy. He last had a colonoscopy in 2006 that was without polyps. Since that time he has not had any changes with his bowel habits. He denies melena, hematochezia, n,v, abd pain or wt loss. He denies FHX of colon CA. No reported adverse reactions to anesthesia or bleeding disorders or known inflammatory bowel disease.    Patient Active Problem List   Diagnosis     Allergy, unspecified not elsewhere classified     Long term (current) use of anticoagulants     PRIMARY HYPERCOAGULABLE STATE; FVL def with hx of PE     Asthma     Impotence of organic origin     Benign non-nodular prostatic hyperplasia with lower urinary tract symptoms     Elevated PSA     Past Medical History:   Diagnosis Date     Acute bronchitis 04/14/2014     Allergy, unspecified not elsewhere classified 05/14/2002     Asymptomatic varicose veins 06/29/2004   Some leg varicosities     Calf swelling 04/17/2014     Cellulitis 04/14/2014   4/15/14 - positive blood culture results, gram negative diplococci in 1/2 bottles; will admit.     Enlargement of lymph nodes 05/11/2004   Pretracheal lymph nodes seen on CT 04-13-04; Chest CT 06-14-04 stable     Factor V Leiden (HCC) 04/14/2014     Iatrogenic pulmonary embolism and infarction (HCC) 02/15/2005   Pulmonary emboli; chest angio DT 04-13-04     Long term (current) use of anticoagulants 10/26/2004   Chronic anticoagulation for clotting disorder     Other abnormal heart sounds 04/21/2004   Believed from tricuspid insufficiency caused by pulmonary hypertension secondary to PE; it has gone away at this time     Primary hypercoagulable state (HCC) 06/01/2004   Factor V leiden abnormality; he is heterozygous     Unspecified asthma(493.90) 05/11/2004     Unspecified nasal polyp 05/16/2002   Hx of nasal polyps; removed 3 times     Past Surgical History:   Procedure Laterality Date     COLONOSCOPY,FLEX,DIAGNOSTIC 09/13/2006   Repeat 2016; No polyps  identified. Moderate sized hemorrhoids. No diverticular disease. He can go ten years before his next colonoscopy.     NASAL PROCEDURE 1997   Nasal polyps removed 3 times; 08-20-97, 09-97     NASAL SCOPY,REMV TOTL ETHMOID 08/19/1997   Bilateral endoscopic anterior and posterior ethmoidectomies, bilateral endoscopic antrostomies     REMOVAL OF SPERM DUCT(S) 12/01/1989     REPAIR ING HERNIA,5+Y/O,REDUCIBL   Bilateral.     SIGMOIDOSCOPY,BIOPSY 09/05/1996   Flex Sig, with biopsy; within normal limits     Current Outpatient Prescriptions   Medication Sig     FLOVENT  MCG/ACT inhalation aerosol USE 2 PUFFS TWICE DAILY     fluticasone (FLOVENT HFA) 220 MCG/ACT inhalation aerosol USE 2 PUFFS TWICE DAILY     warfarin (COUMADIN) 5 MG tablet Take as directed by [facility]. Dose varies.     olopatadine (PATANASE) 0.6 % nasal solution Instill 2 Sprays nasally one time a day. Spray in each nostril.     albuterol HFA (PROAIR HFA) 108 (90 Base) MCG/ACT inhalation aerosol Inhale 1 Puff into the lungs every four hours as needed for Wheezing or Shortness of Breath. Shake before using.     Na Sulfate-K Sulfate-Mg Sulf (SUPREP BOWEL PREP KIT) solution Take as directed per prep sheet     fluocinonide (LIDEX) 0.05 % cream Apply topically two times a day. To eczema patches on hands and body. NOT TO FACE.     triamcinolone acetonide (KENALOG) 0.1 % ointment Apply topically two times a day. Apply a thin film sparingly.     urea (CARMOL 40) 40 % cream Apply topically one time a day. at bedtime to thick areas of skin on feet     montelukast (SINGULAIR) 10 MG tablet Take 1 Tab by mouth at bedtime.     levocetirizine (XYZAL) 5 MG tablet Take 1 Tab by mouth every evening.     omega-3 fatty acid (OMEGA 3) 1000 MG capsule Take by mouth. Takes 3 daily.     Flaxseed, Linseed, (FLAX SEED OIL) 1000 MG CAPS Take 1 Cap by mouth three times a day.     peak flow meter device     VITAMIN D 1000 UNIT OR TABS 1 TABLET DAILY      LUTEIN 6 MG OR CAPS one daily     MULTIVITAL OR TABS 1 TABLET DAILY Shakley brand without vit K     No current facility-administered medications for this visit.     Allergies   Allergen Reactions     Aspirin   Probable     Environmental   Inhalant in type.     Food   Apples, nectarines......throat     Social History   Substance Use Topics     Smoking status: Never Smoker     Smokeless tobacco: Never Used     Alcohol use No     Family History   Problem Relation Age of Onset     Diabetes Father     Hypertension Mother     Neuro Disease Mother   Strokes; on Coumadin     Cardiovascular Disease Other     Blood Disease Daughter   Factor V     Cancer Negative Family Hx   No FHX of colon or prostate CA      ROS: 10 point ROS neg other than the symptoms noted above in the HPI.    Physical Exam:  /75  Resp 18  SpO2 98%  GENERAL APPEARANCE:  male; alert and oriented X3; no acute distress  HEAD: Atraumatic; normocephalic; without lesions  EYES: Conjunctiva not injected and sclera anicteric.  MOUTH/OROPHARYNX: Mallampati 1. Normal lips and tongue; gums and dentition normal; buccal mucosa pink, moist without lesions; palate and pharynx normal without lesion or petechia  NECK: Supple with no nodes, jugular venous distention, thyromegaly or bruits  LUNGS: Normal respirations; good expansion with good diaphragmatic excursion; clear to auscultation and percussion with no extra sounds  HEART: Normal with regular rhythm; normal heart sounds and no murmurs  ABDOMEN: Bowel sounds normal; soft; no masses or tenderness, no hepatosplenomegaly; no bruits; no aneurysm  PULSES: Radial, carotid and dorsal pedal pulses 2+ bilaterally  LOWER EXTREMITIES: Bilateral varicose veins with Trace to 1+ bilateral peripheral edema. No palpable calf tenderness or cords.  SKIN: No bruising or concerning lesions identified.  RECTAL: negative rectal mass; positive external hemorrhoidal skin tissue    1. Colorectal cancer screening: The  patient does meet indications for screening colonoscopy given his age. Risk and benefits of the procedure were explained and all questions were answered. The patient conveys verbal understanding of these risk and wishes to proceed.  We will therefore proceed with colonoscopy with Propofol sedation.  I would like to thank  Jc Reardon for allowing me to participate in the care of Macoelida Benavidez  .

## 2018-05-04 NOTE — DISCHARGE INSTRUCTIONS
INSTRUCTIONS AFTER COLONOSCOPY    WHEN YOU ARE BACK HOME:    Plan to rest for an hour or two after you get home.    You may have some cramping or pressure until you pass gas.    You may resume your regular medications.    Eat a small, light meal at first, and then gradually return to normal meal sizes.  If you had a polyp removed:    Slight bleeding may occur.  You may have a slight blood stain on the toilet paper after a bowel movement.    To lessen the chance of bleeding, avoid heavy exercise for ONE WEEK.  This includes heavy lifting, vigorous sport activities, and heavy physical labor.  You may resume your normal sexual activity.      Avoid aspirin or aspirin products if instructed by your doctor.    WHAT TO WATCH FOR:  Problems rarely occur after the exam; however, it is important for you to watch for early signs of possible problems.  If you have     Unusual pain in your abdomen    Nausea and vomiting that persists    Excessive bleeding    Black or bloody bowel movements    Fever or temperature above 100.6 F  Please call your doctor ([facility] [phone]) or go to the nearest hospital emergency room.    Post-Anesthesia Patient Instructions    IMMEDIATELY FOLLOWING SURGERY:  Do not drive or operate machinery for the first twenty four hours after surgery.  Do not make any important decisions for twenty four hours after surgery or while taking narcotic pain medications or sedatives.  If you develop intractable nausea and vomiting or a severe headache please notify your doctor immediately.    FOLLOW-UP:  Please make an appointment with your surgeon as instructed. You do not need to follow up with anesthesia unless specifically instructed to do so.    WOUND CARE INSTRUCTIONS (if applicable):  Keep a dry clean dressing on the anesthesia/puncture wound site if there is drainage.  Once the wound has quit draining you may leave it open to air.  Generally you should leave the bandage intact for twenty four  hours unless there is drainage.  If the epidural site drains for more than 36-48 hours please call the anesthesia department.    QUESTIONS?:  Please feel free to call your physician or the hospital  if you have any questions, and they will be happy to assist you.

## 2018-05-04 NOTE — ANESTHESIA CARE TRANSFER NOTE
Patient: Maco Benavidez    Procedure(s):  COLONOSCOPY - Wound Class: II-Clean Contaminated    Diagnosis: SCREENING FOR COLORECTAL CANCER  Diagnosis Additional Information: No value filed.    Anesthesia Type:   MAC     Note:  Airway :Nasal Cannula  Patient transferred to:Phase II  Handoff Report: Identifed the Patient, Identified the Reponsible Provider, Reviewed the pertinent medical history, Discussed the surgical course, Reviewed Intra-OP anesthesia mangement and issues during anesthesia, Set expectations for post-procedure period and Allowed opportunity for questions and acknowledgement of understanding      Vitals: (Last set prior to Anesthesia Care Transfer)    CRNA VITALS  5/4/2018 1339 - 5/4/2018 1409      5/4/2018             Resp Rate (observed): (!)  1    Resp Rate (set): 8                Electronically Signed By: OCTAVIA Stewart CRNA  May 4, 2018  2:09 PM

## 2018-05-04 NOTE — OR NURSING
Pateint discharged to home .  Fran score 20. Pain level 0/10.  Discharged from unit via walking .

## 2018-05-04 NOTE — IP AVS SNAPSHOT
[facility] Preop/Phase II    [address] [address]    Phone:  [phone]                                       After Visit Summary   5/4/2018    Maco Benavidez    MRN: [id number]           After Visit Summary Signature Page     I have received my discharge instructions, and my questions have been answered. I have discussed any challenges I see with this plan with the nurse or doctor.    ..........................................................................................................................................  Patient/Patient Representative Signature      ..........................................................................................................................................  Patient Representative Print Name and Relationship to Patient    ..................................................               ................................................  Date                                            Time    ..........................................................................................................................................  Reviewed by Signature/Title    ...................................................              ..............................................  Date                                                            Time

## 2018-05-07 NOTE — ANESTHESIA POSTPROCEDURE EVALUATION
Patient: Maco Benavidez    Procedure(s):  COLONOSCOPY - Wound Class: II-Clean Contaminated    Diagnosis:SCREENING FOR COLORECTAL CANCER  Diagnosis Additional Information: No value filed.    Anesthesia Type:  MAC    Note:  Anesthesia Post Evaluation    Patient participation: Able to fully participate in evaluation  Level of consciousness: awake and alert  Pain management: adequate  Airway patency: patent  Cardiovascular status: acceptable  Respiratory status: acceptable  Hydration status: acceptable  PONV: none     Anesthetic complications: None          Last vitals:  Vitals:    05/04/18 1425 05/04/18 1430 05/04/18 1435   BP: 131/79 128/84 137/88   Resp: 16     SpO2: 95% 95% 95%         Electronically Signed By: OCTAVIA Stewart CRNA  May 7, 2018  7:55 AM

## 2018-05-11 ENCOUNTER — TELEPHONE (OUTPATIENT)
Dept: ALLERGY | Facility: OTHER | Age: 68
End: 2018-05-11

## 2018-05-11 ENCOUNTER — OFFICE VISIT (OUTPATIENT)
Dept: OTOLARYNGOLOGY | Facility: OTHER | Age: 68
End: 2018-05-11
Attending: PHYSICIAN ASSISTANT
Payer: COMMERCIAL

## 2018-05-11 ENCOUNTER — OFFICE VISIT (OUTPATIENT)
Dept: ALLERGY | Facility: OTHER | Age: 68
End: 2018-05-11
Attending: PHYSICIAN ASSISTANT
Payer: COMMERCIAL

## 2018-05-11 ENCOUNTER — MEDICAL CORRESPONDENCE (OUTPATIENT)
Dept: HEALTH INFORMATION MANAGEMENT | Facility: HOSPITAL | Age: 68
End: 2018-05-11

## 2018-05-11 VITALS
WEIGHT: 165 LBS | HEART RATE: 67 BPM | SYSTOLIC BLOOD PRESSURE: 111 MMHG | DIASTOLIC BLOOD PRESSURE: 63 MMHG | OXYGEN SATURATION: 98 % | TEMPERATURE: 97.2 F | BODY MASS INDEX: 23.62 KG/M2 | HEIGHT: 70 IN

## 2018-05-11 DIAGNOSIS — J30.2 CHRONIC SEASONAL ALLERGIC RHINITIS, UNSPECIFIED TRIGGER: ICD-10-CM

## 2018-05-11 DIAGNOSIS — R05.9 COUGH: ICD-10-CM

## 2018-05-11 DIAGNOSIS — J30.2 SEASONAL ALLERGIC RHINITIS, UNSPECIFIED CHRONICITY, UNSPECIFIED TRIGGER: Primary | ICD-10-CM

## 2018-05-11 DIAGNOSIS — J30.2 PERENNIAL ALLERGIC RHINITIS WITH SEASONAL VARIATION: ICD-10-CM

## 2018-05-11 DIAGNOSIS — J32.4 CHRONIC PANSINUSITIS: Primary | ICD-10-CM

## 2018-05-11 DIAGNOSIS — J30.89 PERENNIAL ALLERGIC RHINITIS WITH SEASONAL VARIATION: ICD-10-CM

## 2018-05-11 DIAGNOSIS — J33.9 NASAL POLYP: ICD-10-CM

## 2018-05-11 DIAGNOSIS — T78.40XD ALLERGIC REACTION, SUBSEQUENT ENCOUNTER: ICD-10-CM

## 2018-05-11 PROCEDURE — 95004 PERQ TESTS W/ALRGNC XTRCS: CPT

## 2018-05-11 PROCEDURE — 99213 OFFICE O/P EST LOW 20 MIN: CPT | Mod: 25 | Performed by: PHYSICIAN ASSISTANT

## 2018-05-11 PROCEDURE — 95024 IQ TESTS W/ALLERGENIC XTRCS: CPT

## 2018-05-11 RX ORDER — EPINEPHRINE 0.3 MG/.3ML
0.3 INJECTION SUBCUTANEOUS PRN
Qty: 0.6 ML | Refills: 1 | Status: SHIPPED | OUTPATIENT
Start: 2018-05-11 | End: 2019-10-09

## 2018-05-11 ASSESSMENT — PAIN SCALES - GENERAL: PAINLEVEL: NO PAIN (0)

## 2018-05-11 NOTE — TELEPHONE ENCOUNTER
New SLIT order entered into Epic, sent to be scanned, and added to patient's SLIT chart.  SLIT recipe sheet and payment form faxed to [facility].  Patient notified he will be called when SLIT drops arrive to schedule appointment for first drop administration and education.

## 2018-05-11 NOTE — NURSING NOTE
"Chief Complaint   Patient presents with     Other     Allergy Skin Test       Initial /63 (BP Location: Right arm, Cuff Size: Adult Regular)  Pulse 67  Temp 97.2  F (36.2  C) (Oral)  Ht 5' 10\" (1.778 m)  Wt 165 lb (74.8 kg)  SpO2 98%  BMI 23.68 kg/m2 Estimated body mass index is 23.68 kg/(m^2) as calculated from the following:    Height as of this encounter: 5' 10\" (1.778 m).    Weight as of this encounter: 165 lb (74.8 kg).  Medication Reconciliation: complete    This patient presents today for allergy skin testing.      Symptoms have included cough, post nasal drainage, sinus issues, and seems the same in every season.     This patient lives in a older single family home, with a partial basement.  This patient does not suspect mold, water or moisture issues in the home.  There is carpet in the home, and is in bedroom.  Home has hot water heat and does have air conditioning.        This patient has cat for pets.  Cat is outside and does not go inside or on furniture.    This patient has had allergy testing in the past a few years ago and was doing immunotherapy drops for a brief period.    This patient's medications have been reviewed prior to testing and all appropriate medications have been stopped.    Consent is signed by patient and signature is verified.     MQT/ID test is performed per protocol.  The patient tolerated testing well.  All findings are recorded on the paper flow sheet. Results are reviewed with this patient.  They are given written information regarding allergy.       The patient will follow-up with Kathy ORDOÑEZ for treatment plan.      Danii Harden RN    "

## 2018-05-11 NOTE — PATIENT INSTRUCTIONS
Start allergy drops  Restart Xyzal.   Hold Budesonide rinse. Use as needed.   Continue with nasal sprays.   Follow up in 6 months  Epipen refilled.       Thank you for allowing HARMAN Medrnao and our ENT team to participate in your care.  If your medications are too expensive, please give the nurse a call.  We can possibly change this medication.  If you have a scheduling or an appointment question please contact [facility] Unit Coordinator at their direct line [phone].   ALL nursing questions or concerns can be directed to your ENT nurse at: [phone] [place]

## 2018-05-11 NOTE — MR AVS SNAPSHOT
After Visit Summary   5/11/2018    Maco Benavidez    MRN: [id number]           Patient Information     Date Of Birth          1950        Visit Information        Provider Department      5/11/2018 10:45 AM Kathy Perdue PA-C [facility]        Today's Diagnoses     Chronic pansinusitis    -  1    Cough        Nasal polyp        Perennial allergic rhinitis with seasonal variation        Chronic seasonal allergic rhinitis, unspecified trigger        Allergic reaction, subsequent encounter          Care Instructions    Start allergy drops  Restart Xyzal.   Hold Budesonide rinse. Use as needed.   Continue with nasal sprays.   Follow up in 6 months  Epipen refilled.       Thank you for allowing HARMAN Medrano and our ENT team to participate in your care.  If your medications are too expensive, please give the nurse a call.  We can possibly change this medication.  If you have a scheduling or an appointment question please contact [facility] Unit Coordinator at their direct line [phone].   ALL nursing questions or concerns can be directed to your ENT nurse at: [phone] [facility]              Follow-ups after your visit        Your next 10 appointments already scheduled     May 11, 2018 10:45 AM CDT   (Arrive by 10:30 AM)   Return Visit with Kathy Perdue PA-C   [facility] ([facility] )    [address]  [place] [address]   [phone]              Who to contact     If you have questions or need follow up information about today's clinic visit or your schedule please contact [facility] directly at [phone].  Normal or non-critical lab and imaging results will be communicated to you by MyChart, letter or phone within 4 business days after the clinic has received the results. If you do not hear from us within 7 days, please contact the clinic through MyChart or phone. If you have a critical or abnormal lab result, we will notify  "you by phone as soon as possible.  Submit refill requests through [employer] or call your pharmacy and they will forward the refill request to us. Please allow 3 business days for your refill to be completed.          Additional Information About Your Visit        The [employer] Information     [employer] lets you send messages to your doctor, view your test results, renew your prescriptions, schedule appointments and more. To sign up, go to [email or web address] . Click on \"Log in\" on the left side of the screen, which will take you to the Welcome page. Then click on \"Sign up Now\" on the right side of the page.     You will be asked to enter the access code listed below, as well as some personal information. Please follow the directions to create your username and password.     Your access code is: [email or web address]  Expires: 2018  1:52 PM     Your access code will  in 90 days. If you need help or a new code, please call your [facility] clinic or [phone].        Care EveryWhere ID     This is your Care EveryWhere ID. This could be used by other organizations to access your [facility] medical records  [id number]        Your Vitals Were     Pulse Temperature Height Pulse Oximetry BMI (Body Mass Index)       67 97.2  F (36.2  C) (Oral) 5' 10\" (1.778 m) 98% 23.68 kg/m2        Blood Pressure from Last 3 Encounters:   18 111/63   18 137/88   18 112/60    Weight from Last 3 Encounters:   18 165 lb (74.8 kg)   18 165 lb (74.8 kg)   17 160 lb (72.6 kg)              Today, you had the following     No orders found for display         Today's Medication Changes          These changes are accurate as of 18 10:13 AM.  If you have any questions, ask your nurse or doctor.               These medicines have changed or have updated prescriptions.        Dose/Directions    * EPIPEN 0.3 MG/0.3ML injection   This may have changed:  Another medication with the same name was added. Make sure you " understand how and when to take each.   Generic drug:  EPINEPHrine   Changed by:  Kathy Perdue PA-C        Dose:  0.3 mg   Inject 0.3 mg into the muscle once as needed.   Refills:  0       * EPINEPHrine 0.3 MG/0.3ML injection 2-pack   Commonly known as:  EPIPEN/ADRENACLICK/or ANY BX GENERIC EQUIV   This may have changed:  You were already taking a medication with the same name, and this prescription was added. Make sure you understand how and when to take each.   Used for:  Allergic reaction, subsequent encounter   Changed by:  Kathy Perdue PA-C        Dose:  0.3 mg   Inject 0.3 mLs (0.3 mg) into the muscle as needed for anaphylaxis   Quantity:  0.6 mL   Refills:  1       * Notice:  This list has 2 medication(s) that are the same as other medications prescribed for you. Read the directions carefully, and ask your doctor or other care provider to review them with you.         Where to get your medicines      These medications were sent to [facility] [address] [address]     Phone:  [phone]     EPINEPHrine 0.3 MG/0.3ML injection 2-pack                Primary Care Provider Office Phone # Fax #    Jc Reardon [phone]       [address] [address]        Equal Access to Services     RAJENDRA PINEDA : Hadii mick mcarthur hadasho Sootilio, waaxda luqadaha, qaybta kaalmada adeedilmayada, emmanuelle chase. So [facility] [phone].    ATENCIÓN: Si habla español, tiene a alvarado disposición servicios gratuitos de asistencia lingüística. Llame al [phone].    We comply with applicable federal civil rights laws and [place] laws. We do not discriminate on the basis of race, color, national origin, age, disability, sex, sexual orientation, or gender identity.            Thank you!     Thank you for choosing [facility]  for your care. Our goal is always to provide you with excellent  care. Hearing back from our patients is one way we can continue to improve our services. Please take a few minutes to complete the written survey that you may receive in the mail after your visit with us. Thank you!             Your Updated Medication List - Protect others around you: Learn how to safely use, store and throw away your medicines at www.disposemymeds.org.          This list is accurate as of 5/11/18 10:13 AM.  Always use your most recent med list.                   Brand Name Dispense Instructions for use Diagnosis    acetaminophen 325 MG tablet    TYLENOL    100 tablet    Take 2 tablets (650 mg) by mouth every 4 hours as needed for mild pain or fever    Cellulitis       budesonide 0.5 MG/2ML neb solution    PULMICORT    3 Box    Make 240 cc Jey med sinus irrigation Mix 2 ml vial of budesonide 0.5 mg Rinse daily for 1-2 months    Chronic pansinusitis       cholecalciferol 1000 UNIT tablet    vitamin D3     Take 1 tablet by mouth daily.        COUMADIN 5 MG tablet   Generic drug:  warfarin     30 tablet    Take 7.5 mg by mouth daily Per [facility]        DAILY MULTIVITAMIN PO      Take 1 tablet by mouth daily with food.        * EPIPEN 0.3 MG/0.3ML injection   Generic drug:  EPINEPHrine      Inject 0.3 mg into the muscle once as needed.        * EPINEPHrine 0.3 MG/0.3ML injection 2-pack    EPIPEN/ADRENACLICK/or ANY BX GENERIC EQUIV    0.6 mL    Inject 0.3 mLs (0.3 mg) into the muscle as needed for anaphylaxis    Allergic reaction, subsequent encounter       fish oil-omega-3 fatty acids 1000 MG capsule      Take 3 capsules by mouth daily.        flaxseed oil 1000 MG Caps      Take 1 capsule by mouth 3 times daily.        FLOVENT  MCG/ACT Inhaler   Generic drug:  fluticasone      Take 1 puff by mouth 2 times daily.        fluocinonide 0.05 % cream    LIDEX     Apply topically 2 times daily Apply two times daily to eczema patches on hands and body. Not to face.        fluticasone 50 MCG/ACT  spray    FLONASE    16 g    2 SPRAYS TO EACH NOSTRIL DAILY    Seasonal allergic rhinitis, unspecified chronicity, unspecified trigger, Perennial allergic rhinitis with seasonal variation, Chronic rhinitis, unspecified type, Nasal polyp       ipratropium - albuterol 0.5 mg/2.5 mg/3 mL 0.5-2.5 (3) MG/3ML neb solution    DUONEB    30 vial    Take 1 vial (3 mLs) by nebulization every 6 hours as needed for shortness of breath / dyspnea or wheezing    Cough       levocetirizine 5 MG tablet    XYZAL    30 tablet    TAKE 1 TABLET DAILY IN THE EVENING.    Seasonal allergic rhinitis, Perennial allergic rhinitis with seasonal variation, Chronic rhinitis, Allergic conjunctivitis of both eyes       Lutein 6 MG Caps      Take 1 capsule by mouth daily.        montelukast 10 MG tablet    SINGULAIR    30 tablet    TAKE ONE TABLET AT BEDTIME.    Seasonal allergic rhinitis, Perennial allergic rhinitis with seasonal variation, Chronic rhinitis, Allergic conjunctivitis of both eyes       olopatadine 0.6 % nasal spray    PATANASE    1 Bottle    Spray 2 sprays into both nostrils every morning    Nasal polyp, Chronic sinusitis, unspecified location, S/P FESS (functional endoscopic sinus surgery)       PROAIR  (90 Base) MCG/ACT Inhaler   Generic drug:  albuterol      Inhale 1 puff into the lungs as needed        * Notice:  This list has 2 medication(s) that are the same as other medications prescribed for you. Read the directions carefully, and ask your doctor or other care provider to review them with you.

## 2018-05-11 NOTE — PROGRESS NOTES
Chief Complaint   Patient presents with     Other     Allergy Skin Test     He has been using Jey med rinse daily.   Reports holding Budesonide rinse due to nasal dryness.   Maco returns for recheck from his last visit. His sinuses are feeling better, and notes less pressure. However, drainage remains and may have worsened. He had felt cough improved after his last visit, and has been doing well. Sinuses feel overall controlled and asthma controlled.   Increase in itching related to holding Xyzal.        MQT  Dilution #6- Ragweed, grass, birch, cat, dust.   Dilution #5- maple, oak, anthony, cottonwood, walnut.   Dilution #2- pigweed, thistle, elm, pine, molds, dog.     Past Medical History:   Diagnosis Date     Clotting disorder (H)     Factor V Leiden, history of DVT and PE     Uncomplicated asthma         Allergies   Allergen Reactions     Aspirin      Food      Apples, nectarines......throat     Other [Seasonal Allergies]      Inhalant in type environmental     Current Outpatient Prescriptions   Medication     acetaminophen (TYLENOL) 325 MG tablet     albuterol (ALBUTEROL) 108 (90 BASE) MCG/ACT inhaler     budesonide (PULMICORT) 0.5 MG/2ML neb solution     cholecalciferol (VITAMIN D3) 1000 UNIT tablet     EPINEPHrine (EPIPEN) 0.3 MG/0.3ML injection     fish oil-omega-3 fatty acids (FISH OIL) 1000 MG capsule     Flaxseed, Linseed, (FLAXSEED OIL) 1000 MG CAPS     fluocinonide (LIDEX) 0.05 % cream     fluticasone (FLONASE) 50 MCG/ACT spray     fluticasone (FLOVENT HFA) 220 MCG/ACT inhaler     ipratropium - albuterol 0.5 mg/2.5 mg/3 mL (DUONEB) 0.5-2.5 (3) MG/3ML neb solution     levocetirizine (XYZAL) 5 MG tablet     Lutein 6 MG CAPS     montelukast (SINGULAIR) 10 MG tablet     Multiple Vitamin (DAILY MULTIVITAMIN PO)     olopatadine (PATANASE) 0.6 % nasal spray     warfarin (COUMADIN) 5 MG tablet     No current facility-administered medications for this visit.       ROS: 10 point ROS neg other than the symptoms noted  "above in the HPI.  /63 (BP Location: Right arm, Cuff Size: Adult Regular)  Pulse 67  Temp 97.2  F (36.2  C) (Oral)  Ht 5' 10\" (1.778 m)  Wt 165 lb (74.8 kg)  SpO2 98%  BMI 23.68 kg/m2    General - The patient is well nourished and well developed, and appears to have good nutritional status.  Alert and oriented to person and place, interactive.  Head and Face - Normocephalic and atraumatic, with no gross asymmetry noted of the contour of the facial features.  The facial nerve is intact, with strong symmetric movements.  Neck-no palpable lymphadenopathy or thyroid mass.  Trachea is midline.  Eyes - Extraocular movements intact.   Ears- External auditory canals are patent, tympanic membranes are intact without effusion or worrisome retractions   Nose - Nasal mucosa is pink and moist with no abnormal mucus.  The septum was  non-obstructive, turbinates of normal size and position. Polyps noted.   Mouth - Examination of the oral cavity shows pink, healthy, moist mucosa.  No lesions or ulceration noted.  The dentition are in good repair.  The tongue is mobile and midline.  Throat - The walls of the oropharynx were smooth, pink, moist, symmetric, and had no lesions or ulcerations.  The tonsillar pillars and soft palate were symmetric.  The uvula was midline on elevation.     ASSESSMENT:        ICD-10-CM    1. Chronic pansinusitis J32.4    2. Cough R05    3. Nasal polyp J33.9    4. Perennial allergic rhinitis with seasonal variation J30.89     J30.2    5. Chronic seasonal allergic rhinitis, unspecified trigger J30.2    6. Allergic reaction, subsequent encounter T78.40XD EPINEPHrine (EPIPEN/ADRENACLICK/OR ANY BX GENERIC EQUIV) 0.3 MG/0.3ML injection 2-pack     He took 10 mg Claritin now  Start Xyzal tonight.   Resume Jey med rinse. Use Budesonide PRN    Epipen called in  Start SLIT  Monitor nasal/ sinus symptoms.   Return in 6 months.     Kathy Perdue PA-C  ENT  [facility] [place]  [phone]    "

## 2018-05-11 NOTE — PROGRESS NOTES
Maco was seen 05/11/18  for allergy skin testing. Patient was seen by this nurse in conjunction with ENT provider. All encounter details are documented in ENT Provider's appointment from this same date. Please see referenced encounter for this visits documentation.     Danii Harden

## 2018-05-21 ENCOUNTER — ALLIED HEALTH/NURSE VISIT (OUTPATIENT)
Dept: ALLERGY | Facility: OTHER | Age: 68
End: 2018-05-21
Attending: PHYSICIAN ASSISTANT
Payer: COMMERCIAL

## 2018-05-21 DIAGNOSIS — J30.1 ALLERGIC RHINITIS DUE TO POLLEN: Primary | ICD-10-CM

## 2018-05-21 NOTE — MR AVS SNAPSHOT
"              After Visit Summary   2018    Maco Benavidez    MRN: [id number]           Patient Information     Date Of Birth          1950        Visit Information        Provider Department      2018 8:30 AM HC SHOT ROOM [facility]        Today's Diagnoses     Allergic rhinitis due to pollen    -  1       Follow-ups after your visit        Who to contact     If you have questions or need follow up information about today's clinic visit or your schedule please contact [facility] directly at [phone].  Normal or non-critical lab and imaging results will be communicated to you by MyChart, letter or phone within 4 business days after the clinic has received the results. If you do not hear from us within 7 days, please contact the clinic through [employer] [facility] or phone. If you have a critical or abnormal lab result, we will notify you by phone as soon as possible.  Submit refill requests through [employer] or call your pharmacy and they will forward the refill request to us. Please allow 3 business days for your refill to be completed.          Additional Information About Your Visit        MyChart Information     [employer] lets you send messages to your doctor, view your test results, renew your prescriptions, schedule appointments and more. To sign up, go to [email or web address] . Click on \"Log in\" on the left side of the screen, which will take you to the Welcome page. Then click on \"Sign up Now\" on the right side of the page.     You will be asked to enter the access code listed below, as well as some personal information. Please follow the directions to create your username and password.     Your access code is: [email or web address]  Expires: 2018  1:52 PM     Your access code will  in 90 days. If you need help or a new code, please call your [facility] or [phone].        Care EveryWhere ID     This is your Care EveryWhere ID. This could be used by other " organizations to access your [place] medical records  [id number]         Blood Pressure from Last 3 Encounters:   05/11/18 111/63   05/04/18 137/88   03/13/18 112/60    Weight from Last 3 Encounters:   05/11/18 165 lb (74.8 kg)   03/13/18 165 lb (74.8 kg)   11/03/17 160 lb (72.6 kg)              Today, you had the following     No orders found for display       Primary Care Provider Office Phone # Fax #    Jc Reardon [phone]       [facility] [address] [facility] [address]        Equal Access to Services     [facility]: Hadii mick Carroll, wakristel arzola, jeffryybkrzysztof kaalmajessika anne, emmanuelle mary . So [facility] [phone].    ATENCIÓN: Si habla español, tiene a alvarado disposición servicios gratuitos de asistencia lingüística. [facility] [phone].    We comply with applicable federal civil rights laws and [place] laws. We do not discriminate on the basis of race, color, national origin, age, disability, sex, sexual orientation, or gender identity.            Thank you!     Thank you for choosing [facility]  for your care. Our goal is always to provide you with excellent care. Hearing back from our patients is one way we can continue to improve our services. Please take a few minutes to complete the written survey that you may receive in the mail after your visit with us. Thank you!             Your Updated Medication List - Protect others around you: Learn how to safely use, store and throw away your medicines at www.disposemymeds.org.          This list is accurate as of 5/21/18  9:43 AM.  Always use your most recent med list.                   Brand Name Dispense Instructions for use Diagnosis    acetaminophen 325 MG tablet    TYLENOL    100 tablet    Take 2 tablets (650 mg) by mouth every 4 hours as needed for mild pain or fever    Cellulitis       budesonide 0.5 MG/2ML neb solution    PULMICORT    3 Box    Make 240 cc Jey  med sinus irrigation Mix 2 ml vial of budesonide 0.5 mg Rinse daily for 1-2 months    Chronic pansinusitis       cholecalciferol 1000 UNIT tablet    vitamin D3     Take 1 tablet by mouth daily.        COUMADIN 5 MG tablet   Generic drug:  warfarin     30 tablet    Take 7.5 mg by mouth daily [facility]        DAILY MULTIVITAMIN PO      Take 1 tablet by mouth daily with food.        * EPIPEN 0.3 MG/0.3ML injection   Generic drug:  EPINEPHrine      Inject 0.3 mg into the muscle once as needed.        * EPINEPHrine 0.3 MG/0.3ML injection 2-pack    EPIPEN/ADRENACLICK/or ANY BX GENERIC EQUIV    0.6 mL    Inject 0.3 mLs (0.3 mg) into the muscle as needed for anaphylaxis    Allergic reaction, subsequent encounter       fish oil-omega-3 fatty acids 1000 MG capsule      Take 3 capsules by mouth daily.        flaxseed oil 1000 MG Caps      Take 1 capsule by mouth 3 times daily.        FLOVENT  MCG/ACT Inhaler   Generic drug:  fluticasone      Take 1 puff by mouth 2 times daily.        fluocinonide 0.05 % cream    LIDEX     Apply topically 2 times daily Apply two times daily to eczema patches on hands and body. Not to face.        fluticasone 50 MCG/ACT spray    FLONASE    16 g    2 SPRAYS TO EACH NOSTRIL DAILY    Seasonal allergic rhinitis, unspecified chronicity, unspecified trigger, Perennial allergic rhinitis with seasonal variation, Chronic rhinitis, unspecified type, Nasal polyp       ipratropium - albuterol 0.5 mg/2.5 mg/3 mL 0.5-2.5 (3) MG/3ML neb solution    DUONEB    30 vial    Take 1 vial (3 mLs) by nebulization every 6 hours as needed for shortness of breath / dyspnea or wheezing    Cough       levocetirizine 5 MG tablet    XYZAL    30 tablet    TAKE 1 TABLET DAILY IN THE EVENING.    Seasonal allergic rhinitis, Perennial allergic rhinitis with seasonal variation, Chronic rhinitis, Allergic conjunctivitis of both eyes       Lutein 6 MG Caps      Take 1 capsule by mouth daily.        montelukast 10 MG  tablet    SINGULAIR    30 tablet    TAKE ONE TABLET AT BEDTIME.    Seasonal allergic rhinitis, Perennial allergic rhinitis with seasonal variation, Chronic rhinitis, Allergic conjunctivitis of both eyes       olopatadine 0.6 % nasal spray    PATANASE    1 Bottle    Spray 2 sprays into both nostrils every morning    Nasal polyp, Chronic sinusitis, unspecified location, S/P FESS (functional endoscopic sinus surgery)       PROAIR  (90 Base) MCG/ACT Inhaler   Generic drug:  albuterol      Inhale 1 puff into the lungs as needed        SUBLINGUAL IMMUNOTHERAPY (SLIT)     15 mL    Proceed with SLIT per standard build up schedule    Seasonal allergic rhinitis, unspecified chronicity, unspecified trigger       * Notice:  This list has 2 medication(s) that are the same as other medications prescribed for you. Read the directions carefully, and ask your doctor or other care provider to review them with you.

## 2018-05-21 NOTE — PROGRESS NOTES
Prior to education verified patient identity using patient's name and date of birth.    The patient presents today to  their new SLIT drops.  He has current epi pens with him today. He has been on SLIT in the past, but all SLIT information is reviewed with him today.      The s/s of local reactions, angioedema, and anaphylaxis are discussed with the patient.  The patient is educated on EPI-PEN use.  The patient verbalized understanding of how and when to use their EPI-PEN.  A return demonstration was given by the patient in the office today.    The patient is taught how to use SLIT and all instructions regarding SLIT use are reviewed with them today.  They verbalized understanding.     They are aware additional drops can be obtained by calling [facility], and the drops will be mailed to them at home.    They are aware that they must have current epi pens through their entire SLIT treatment.    The first SLIT drop is taken by the patient in the office today.  The patient is held for 20 minutes after the drop administration to watch for possible reactions.  They experienced no reactions.      A  follow-up appoint will be needed 6 months from now.  He will call to arrange.      Mihaela Nelson RN

## 2018-08-09 ENCOUNTER — TELEPHONE (OUTPATIENT)
Dept: ALLERGY | Facility: OTHER | Age: 68
End: 2018-08-09

## 2018-08-09 NOTE — TELEPHONE ENCOUNTER
Spoke with patient regarding SLIT refill.  Patient reports drops are going well with no issues.  Patient's epi-pen is current.  Patient is not due for a follow-up until November.  Will process refill request.    Tanisha Ramirez RN

## 2018-10-15 DIAGNOSIS — J30.2 SEASONAL ALLERGIC RHINITIS: ICD-10-CM

## 2018-10-15 DIAGNOSIS — J30.89 PERENNIAL ALLERGIC RHINITIS WITH SEASONAL VARIATION: ICD-10-CM

## 2018-10-15 DIAGNOSIS — J30.2 PERENNIAL ALLERGIC RHINITIS WITH SEASONAL VARIATION: ICD-10-CM

## 2018-10-15 DIAGNOSIS — J33.9 NASAL POLYP: ICD-10-CM

## 2018-10-15 DIAGNOSIS — J31.0 CHRONIC RHINITIS: ICD-10-CM

## 2018-10-16 NOTE — TELEPHONE ENCOUNTER
flonase      Last Written Prescription Date:  9/22/17  Last Fill Quantity: 16g,   # refills: 11  Last Office Visit: 5/11/18  Future Office visit:

## 2018-10-17 DIAGNOSIS — Z98.890 S/P FESS (FUNCTIONAL ENDOSCOPIC SINUS SURGERY): ICD-10-CM

## 2018-10-17 DIAGNOSIS — J32.9 CHRONIC SINUSITIS, UNSPECIFIED LOCATION: ICD-10-CM

## 2018-10-17 DIAGNOSIS — J33.9 NASAL POLYP: ICD-10-CM

## 2018-10-17 RX ORDER — FLUTICASONE PROPIONATE 50 MCG
SPRAY, SUSPENSION (ML) NASAL
Qty: 16 G | Refills: 1 | Status: SHIPPED | OUTPATIENT
Start: 2018-10-17 | End: 2019-01-16

## 2018-10-17 NOTE — TELEPHONE ENCOUNTER
OLOPATADINE 665 MCG NASAL SPRY    Last Written Prescription Date:  9/22/17  Last Fill Quantity: 1 bottle ,   # refills: 11  Last Office Visit: 5/11/18  Future Office visit:       Routing refill request to provider for review/approval because:

## 2018-10-18 RX ORDER — OLOPATADINE HYDROCHLORIDE 665 UG/1
SPRAY NASAL
Qty: 30.5 G | Refills: 6 | Status: SHIPPED | OUTPATIENT
Start: 2018-10-18 | End: 2019-11-22

## 2018-11-12 ENCOUNTER — TELEPHONE (OUTPATIENT)
Dept: ALLERGY | Facility: OTHER | Age: 68
End: 2018-11-12

## 2018-11-12 NOTE — TELEPHONE ENCOUNTER
Patient requests a refill of SLIT drops.  This will be done after a signature is obtained from the ENT provider.    His last follow-up visit was 5/2018 with TIAGO Medrano.  The patient is due for an appointment at this time.    I spoke with the patient.  Heis doing fine on drops, and has current epi pens.    He will arrange his follow-up with the ENT hucs.    Mihaela Nelson RN

## 2018-12-06 ENCOUNTER — OFFICE VISIT (OUTPATIENT)
Dept: OTOLARYNGOLOGY | Facility: OTHER | Age: 68
End: 2018-12-06
Attending: PHYSICIAN ASSISTANT
Payer: COMMERCIAL

## 2018-12-06 VITALS
TEMPERATURE: 98.1 F | BODY MASS INDEX: 24.34 KG/M2 | HEART RATE: 77 BPM | SYSTOLIC BLOOD PRESSURE: 120 MMHG | DIASTOLIC BLOOD PRESSURE: 66 MMHG | WEIGHT: 170 LBS | OXYGEN SATURATION: 97 % | HEIGHT: 70 IN

## 2018-12-06 DIAGNOSIS — J30.2 SEASONAL ALLERGIC RHINITIS, UNSPECIFIED TRIGGER: ICD-10-CM

## 2018-12-06 DIAGNOSIS — J30.89 PERENNIAL ALLERGIC RHINITIS WITH SEASONAL VARIATION: ICD-10-CM

## 2018-12-06 DIAGNOSIS — R05.9 COUGH: ICD-10-CM

## 2018-12-06 DIAGNOSIS — J32.9 CHRONIC SINUSITIS, UNSPECIFIED LOCATION: Primary | ICD-10-CM

## 2018-12-06 DIAGNOSIS — J33.9 NASAL POLYP: ICD-10-CM

## 2018-12-06 DIAGNOSIS — Z98.890 S/P FESS (FUNCTIONAL ENDOSCOPIC SINUS SURGERY): ICD-10-CM

## 2018-12-06 DIAGNOSIS — J30.2 PERENNIAL ALLERGIC RHINITIS WITH SEASONAL VARIATION: ICD-10-CM

## 2018-12-06 PROCEDURE — 99213 OFFICE O/P EST LOW 20 MIN: CPT | Performed by: PHYSICIAN ASSISTANT

## 2018-12-06 RX ORDER — TAMSULOSIN HYDROCHLORIDE 0.4 MG/1
0.4 CAPSULE ORAL DAILY
COMMUNITY
End: 2019-11-22

## 2018-12-06 RX ORDER — LEVOCETIRIZINE DIHYDROCHLORIDE 5 MG/1
TABLET, FILM COATED ORAL
Qty: 30 TABLET | Refills: 11 | Status: SHIPPED | OUTPATIENT
Start: 2018-12-06 | End: 2019-12-23

## 2018-12-06 RX ORDER — MONTELUKAST SODIUM 10 MG/1
TABLET ORAL
Qty: 30 TABLET | Refills: 11 | Status: SHIPPED | OUTPATIENT
Start: 2018-12-06 | End: 2019-12-23

## 2018-12-06 RX ORDER — BUDESONIDE 0.5 MG/2ML
INHALANT ORAL
Qty: 3 BOX | Refills: 1 | Status: SHIPPED | OUTPATIENT
Start: 2018-12-06 | End: 2021-04-27

## 2018-12-06 ASSESSMENT — PAIN SCALES - GENERAL: PAINLEVEL: NO PAIN (0)

## 2018-12-06 NOTE — NURSING NOTE
"Chief Complaint   Patient presents with     Allergies     Pt is here for a f/u slit.  Pt continues to have the post nasal drip which causes a cough.       Initial /66 (BP Location: Right arm, Cuff Size: Adult Regular)  Pulse 77  Temp 98.1  F (36.7  C) (Tympanic)  Ht 1.778 m (5' 10\")  Wt 77.1 kg (170 lb)  SpO2 97%  BMI 24.39 kg/m2 Estimated body mass index is 24.39 kg/(m^2) as calculated from the following:    Height as of this encounter: 1.778 m (5' 10\").    Weight as of this encounter: 77.1 kg (170 lb).  Medication Reconciliation: complete    Philomena Pineda LPN    "

## 2018-12-06 NOTE — LETTER
12/6/2018         RE: Maco Benavidez  [address]  [place] [address]        Dear Colleague,    Thank you for referring your patient, Maco Benavidez, to the [facility] [place]. Please see a copy of my visit note below.    Chief Complaint   Patient presents with     Allergies     Pt is here for a f/u slit.  Pt continues to have the post nasal drip which causes a cough.     Maco returns for recheck.   He has been on SLIT and tolerating well.   Reports that he has been using Flonase, patanase, Singulair and Xyzal  He has been using eye drops for his eyes at this time. He has been using PRN pending symptoms.  He does feel these have been aiding in relief.     Epipen is up to date  Maco has been doing well with his sinuses. He feels this is stable and intermittent coughing from PND. He had improvement from the cough initially, then in October coughing for 3 weeks. His cough has improved. Maco does have good days, and coughing by the end of the week. He feels related to sleep possibly.     Exposures to dust or irritations do worsen symptoms.     Past Medical History:   Diagnosis Date     Clotting disorder (H)     Factor V Leiden, history of DVT and PE     Uncomplicated asthma         Allergies   Allergen Reactions     Aspirin      Food      Apples, nectarines......throat     Other [Seasonal Allergies]      Inhalant in type environmental     Current Outpatient Prescriptions   Medication     acetaminophen (TYLENOL) 325 MG tablet     albuterol (ALBUTEROL) 108 (90 BASE) MCG/ACT inhaler     cholecalciferol (VITAMIN D3) 1000 UNIT tablet     EPINEPHrine (EPIPEN/ADRENACLICK/OR ANY BX GENERIC EQUIV) 0.3 MG/0.3ML injection 2-pack     fish oil-omega-3 fatty acids (FISH OIL) 1000 MG capsule     Flaxseed, Linseed, (FLAXSEED OIL) 1000 MG CAPS     fluocinonide (LIDEX) 0.05 % cream     fluticasone (FLONASE) 50 MCG/ACT spray     fluticasone (FLOVENT HFA) 220 MCG/ACT inhaler     levocetirizine (XYZAL) 5 MG tablet      "Lutein 6 MG CAPS     montelukast (SINGULAIR) 10 MG tablet     Multiple Vitamin (DAILY MULTIVITAMIN PO)     olopatadine (PATANASE) 0.6 % nasal spray     SUBLINGUAL IMMUNOTHERAPY, SLIT,     tamsulosin (FLOMAX) 0.4 MG capsule     warfarin (COUMADIN) 5 MG tablet     No current facility-administered medications for this visit.       ROS: 10 point ROS neg other than the symptoms noted above in the HPI.  /66 (BP Location: Right arm, Cuff Size: Adult Regular)  Pulse 77  Temp 98.1  F (36.7  C) (Tympanic)  Ht 1.778 m (5' 10\")  Wt 77.1 kg (170 lb)  SpO2 97%  BMI 24.39 kg/m2  General - The patient is well nourished and well developed, and appears to have good nutritional status.  Alert and oriented to person and place, interactive.  Head and Face - Normocephalic and atraumatic, with no gross asymmetry noted of the contour of the facial features.  The facial nerve is intact, with strong symmetric movements.  Neck-no palpable lymphadenopathy or thyroid mass.  Trachea is midline.  Eyes - Extraocular movements intact.   Ears- External auditory canals are patent, tympanic membranes are intact without effusion or worrisome retractions   Nose - Nasal mucosa is pink and moist with no abnormal mucus.  The septum was  non-obstructive, turbinates of normal size and position. Polyps noted.   Mouth - Examination of the oral cavity shows pink, healthy, moist mucosa.  No lesions or ulceration noted.  The dentition are in good repair.  The tongue is mobile and midline.  Throat - The walls of the oropharynx were smooth, pink, moist, symmetric, and had no lesions or ulcerations.  The tonsillar pillars and soft palate were symmetric.  The uvula was midline on elevation.     ASSESSMENT:    ICD-10-CM    1. Chronic sinusitis, unspecified location J32.9    2. S/P distant FESS (functional endoscopic sinus surgery) Z98.890    3. Seasonal allergic rhinitis, unspecified trigger J30.2 levocetirizine (XYZAL) 5 MG tablet     montelukast " (SINGULAIR) 10 MG tablet   4. Perennial allergic rhinitis with seasonal variation J30.89 levocetirizine (XYZAL) 5 MG tablet    J30.2 montelukast (SINGULAIR) 10 MG tablet   5. Nasal polyp J33.9    6. Cough R05      Continue with Xyzal and Singulair  Use rinse daily. Add nasal saline throughout the day for nasal dryness.   Continue with Flonase, patanase.     Continue with allergy drops.   Budesonide PRN   Continue with hortensia med rinse daily.   Follow up in 6-12 months    Kathy Perdue PA-C  ENT  [facility]  [phone]        Again, thank you for allowing me to participate in the care of your patient.        Sincerely,        Kathy Perdue PA-C

## 2018-12-06 NOTE — PATIENT INSTRUCTIONS
Continue with Xyzal and Singulair  Use rinse daily. Add nasal saline throughout the day for nasal dryness.   Continue with Flonase, patanase.     Continue with allergy drops.   Follow up in 6-12 months    Thank you for allowing HARMAN Medrano and our ENT team to participate in your care.  If your medications are too expensive, please give the nurse a call.  We can possibly change this medication.  If you have a scheduling or an appointment question please contact our Health Unit Coordinator at their direct line [phone].   ALL nursing questions or concerns can be directed to your ENT nurse at: [phone] [facility]  Budesonide nasal saline irrigation per instructions:  -Obtain Jey Med Sinus rinse over the counter.    -Use warm distilled water and 2 packets of the salt solution that comes with the bottle, dissolve in bottle up to the 240 mL mercedez.  -Add 1 vial of budesonide.  -Irrigate each side of your nose leaning over the sink, using 1/3 to 1/2 the volume of the bottle in each nostril every irrigation.  Irrigate 2 times daily.  -If additional rinses are needed/recommended, you may use the plan Jey Med Sinus irrigation without the use of added budesonide

## 2018-12-06 NOTE — PROGRESS NOTES
Chief Complaint   Patient presents with     Allergies     Pt is here for a f/u slit.  Pt continues to have the post nasal drip which causes a cough.     Maco returns for recheck.   He has been on SLIT and tolerating well.   Reports that he has been using Flonase, patanase, Singulair and Xyzal  He has been using eye drops for his eyes at this time. He has been using PRN pending symptoms.  He does feel these have been aiding in relief.     Epipen is up to date  Maco has been doing well with his sinuses. He feels this is stable and intermittent coughing from PND. He had improvement from the cough initially, then in October coughing for 3 weeks. His cough has improved. Maco does have good days, and coughing by the end of the week. He feels related to sleep possibly.     Exposures to dust or irritations do worsen symptoms.     Past Medical History:   Diagnosis Date     Clotting disorder (H)     Factor V Leiden, history of DVT and PE     Uncomplicated asthma         Allergies   Allergen Reactions     Aspirin      Food      Apples, nectarines......throat     Other [Seasonal Allergies]      Inhalant in type environmental     Current Outpatient Prescriptions   Medication     acetaminophen (TYLENOL) 325 MG tablet     albuterol (ALBUTEROL) 108 (90 BASE) MCG/ACT inhaler     cholecalciferol (VITAMIN D3) 1000 UNIT tablet     EPINEPHrine (EPIPEN/ADRENACLICK/OR ANY BX GENERIC EQUIV) 0.3 MG/0.3ML injection 2-pack     fish oil-omega-3 fatty acids (FISH OIL) 1000 MG capsule     Flaxseed, Linseed, (FLAXSEED OIL) 1000 MG CAPS     fluocinonide (LIDEX) 0.05 % cream     fluticasone (FLONASE) 50 MCG/ACT spray     fluticasone (FLOVENT HFA) 220 MCG/ACT inhaler     levocetirizine (XYZAL) 5 MG tablet     Lutein 6 MG CAPS     montelukast (SINGULAIR) 10 MG tablet     Multiple Vitamin (DAILY MULTIVITAMIN PO)     olopatadine (PATANASE) 0.6 % nasal spray     SUBLINGUAL IMMUNOTHERAPY, SLIT,     tamsulosin (FLOMAX) 0.4 MG capsule     warfarin  "(COUMADIN) 5 MG tablet     No current facility-administered medications for this visit.       ROS: 10 point ROS neg other than the symptoms noted above in the HPI.  /66 (BP Location: Right arm, Cuff Size: Adult Regular)  Pulse 77  Temp 98.1  F (36.7  C) (Tympanic)  Ht 1.778 m (5' 10\")  Wt 77.1 kg (170 lb)  SpO2 97%  BMI 24.39 kg/m2  General - The patient is well nourished and well developed, and appears to have good nutritional status.  Alert and oriented to person and place, interactive.  Head and Face - Normocephalic and atraumatic, with no gross asymmetry noted of the contour of the facial features.  The facial nerve is intact, with strong symmetric movements.  Neck-no palpable lymphadenopathy or thyroid mass.  Trachea is midline.  Eyes - Extraocular movements intact.   Ears- External auditory canals are patent, tympanic membranes are intact without effusion or worrisome retractions   Nose - Nasal mucosa is pink and moist with no abnormal mucus.  The septum was  non-obstructive, turbinates of normal size and position. Polyps noted.   Mouth - Examination of the oral cavity shows pink, healthy, moist mucosa.  No lesions or ulceration noted.  The dentition are in good repair.  The tongue is mobile and midline.  Throat - The walls of the oropharynx were smooth, pink, moist, symmetric, and had no lesions or ulcerations.  The tonsillar pillars and soft palate were symmetric.  The uvula was midline on elevation.     ASSESSMENT:    ICD-10-CM    1. Chronic sinusitis, unspecified location J32.9    2. S/P distant FESS (functional endoscopic sinus surgery) Z98.890    3. Seasonal allergic rhinitis, unspecified trigger J30.2 levocetirizine (XYZAL) 5 MG tablet     montelukast (SINGULAIR) 10 MG tablet   4. Perennial allergic rhinitis with seasonal variation J30.89 levocetirizine (XYZAL) 5 MG tablet    J30.2 montelukast (SINGULAIR) 10 MG tablet   5. Nasal polyp J33.9    6. Cough R05      Continue with Xyzal and " Singulair  Use rinse daily. Add nasal saline throughout the day for nasal dryness.   Continue with Flonase, patanase.     Continue with allergy drops.   Budesonide PRN   Continue with hortensia med rinse daily.   Follow up in 6-12 months    Kathy Perdue PA-C  ENT  [facility]  [phone]

## 2018-12-06 NOTE — MR AVS SNAPSHOT
After Visit Summary   12/6/2018    Maco Benavidez    MRN: [id number]           Patient Information     Date Of Birth          1950        Visit Information        Provider Department      12/6/2018 3:00 PM Kathy Perdue PA-C [facility]        Today's Diagnoses     Chronic sinusitis, unspecified location    -  1    S/P distant FESS (functional endoscopic sinus surgery)        Seasonal allergic rhinitis, unspecified trigger        Perennial allergic rhinitis with seasonal variation        Nasal polyp        Cough        Chronic rhinitis        Allergic conjunctivitis of both eyes        Chronic pansinusitis          Care Instructions    Continue with Xyzal and Singulair  Use rinse daily. Add nasal saline throughout the day for nasal dryness.   Continue with Flonase, patanase.     Continue with allergy drops.   Follow up in 6-12 months    Thank you for allowing HARMAN Medrano and our ENT team to participate in your care.  If your medications are too expensive, please give the nurse a call.  We can possibly change this medication.  If you have a scheduling or an appointment question please contact our Health Unit Coordinator at their direct line [phone].   ALL nursing questions or concerns can be directed to your ENT nurse at: [phone] [place]  Budesonide nasal saline irrigation per instructions:  -Obtain Jey Med Sinus rinse over the counter.    -Use warm distilled water and 2 packets of the salt solution that comes with the bottle, dissolve in bottle up to the 240 mL mercedez.  -Add 1 vial of budesonide.  -Irrigate each side of your nose leaning over the sink, using 1/3 to 1/2 the volume of the bottle in each nostril every irrigation.  Irrigate 2 times daily.  -If additional rinses are needed/recommended, you may use the plan Jey Med Sinus irrigation without the use of added budesonide              Follow-ups after your visit        Who to contact     If you have questions or  "need follow up information about today's clinic visit or your schedule please contact [facility] directly at [phone].  Normal or non-critical lab and imaging results will be communicated to you by MyChart, letter or phone within 4 business days after the clinic has received the results. If you do not hear from us within 7 days, please contact the clinic through MyChart or phone. If you have a critical or abnormal lab result, we will notify you by phone as soon as possible.  Submit refill requests through [employer] or call your pharmacy and they will forward the refill request to us. Please allow 3 business days for your refill to be completed.          Additional Information About Your Visit        Care EveryWhere ID     This is your Care EveryWhere ID. This could be used by other organizations to access your [place] medical records  [id number]        Your Vitals Were     Pulse Temperature Height Pulse Oximetry BMI (Body Mass Index)       77 98.1  F (36.7  C) (Tympanic) 1.778 m (5' 10\") 97% 24.39 kg/m2        Blood Pressure from Last 3 Encounters:   12/06/18 120/66   05/11/18 111/63   05/04/18 137/88    Weight from Last 3 Encounters:   12/06/18 77.1 kg (170 lb)   05/11/18 74.8 kg (165 lb)   03/13/18 74.8 kg (165 lb)              Today, you had the following     No orders found for display         Where to get your medicines      These medications were sent to [facility] [address] [address]     Phone:  [phone]     budesonide 0.5 MG/2ML neb solution    levocetirizine 5 MG tablet    montelukast 10 MG tablet          Primary Care Provider Office Phone # Fax #    Jc Reardon [phone]       [address] [address]        Equal Access to Services     EPHRAIM PINEDA AH: Hadii mick mcarthur hadasho Soomaali, waaxda luqadaha, qaybta kaalmajessika anne, emmanuelle mendoza " lacy galvezaan ah. So [facility] [phone].    ATENCIÓN: Si booker washburn, tiene a alvarado disposición servicios gratuitos de asistencia lingüística. Iman flores [phone].    We comply with applicable federal civil rights laws and [place] laws. We do not discriminate on the basis of race, color, national origin, age, disability, sex, sexual orientation, or gender identity.            Thank you!     Thank you for choosing [facility]  for your care. Our goal is always to provide you with excellent care. Hearing back from our patients is one way we can continue to improve our services. Please take a few minutes to complete the written survey that you may receive in the mail after your visit with us. Thank you!             Your Updated Medication List - Protect others around you: Learn how to safely use, store and throw away your medicines at www.disposemymeds.org.          This list is accurate as of 12/6/18  3:15 PM.  Always use your most recent med list.                   Brand Name Dispense Instructions for use Diagnosis    acetaminophen 325 MG tablet    TYLENOL    100 tablet    Take 2 tablets (650 mg) by mouth every 4 hours as needed for mild pain or fever    Cellulitis       budesonide 0.5 MG/2ML neb solution    PULMICORT    3 Box    Make 240 cc Jey med sinus irrigation Mix 2 ml vial of budesonide 0.5 mg Rinse daily for 1-2 months    Chronic pansinusitis       COUMADIN 5 MG tablet   Generic drug:  warfarin     30 tablet    Take 7.5 mg by mouth daily [facility]        DAILY MULTIVITAMIN PO      Take 1 tablet by mouth daily with food.        EPINEPHrine 0.3 MG/0.3ML injection 2-pack    EPIPEN/ADRENACLICK/or ANY BX GENERIC EQUIV    0.6 mL    Inject 0.3 mLs (0.3 mg) into the muscle as needed for anaphylaxis    Allergic reaction, subsequent encounter       fish oil-omega-3 fatty acids 1000 MG capsule      Take 3 capsules by mouth daily.        flaxseed oil 1000 MG Caps      Take 1 capsule by  mouth 3 times daily.        FLOMAX 0.4 MG capsule   Generic drug:  tamsulosin      Take 0.4 mg by mouth daily        FLOVENT  MCG/ACT inhaler   Generic drug:  fluticasone      Take 1 puff by mouth 2 times daily.        fluocinonide 0.05 % external cream    LIDEX     Apply topically 2 times daily Apply two times daily to eczema patches on hands and body. Not to face.        fluticasone 50 MCG/ACT nasal spray    FLONASE    16 g    INSTILL 2 SPRAYS TO EACH NOSTRIL ONCE DAILY.    Seasonal allergic rhinitis, Perennial allergic rhinitis with seasonal variation, Chronic rhinitis, Nasal polyp       levocetirizine 5 MG tablet    XYZAL    30 tablet    TAKE 1 TABLET BY MOUTH DAILY IN THE EVENING.    Seasonal allergic rhinitis, unspecified trigger, Perennial allergic rhinitis with seasonal variation, Chronic rhinitis, Allergic conjunctivitis of both eyes       Lutein 6 MG Caps      Take 1 capsule by mouth daily.        montelukast 10 MG tablet    SINGULAIR    30 tablet    TAKE ONE TABLET BY MOUTH AT BEDTIME.    Seasonal allergic rhinitis, unspecified trigger, Perennial allergic rhinitis with seasonal variation, Chronic rhinitis, Allergic conjunctivitis of both eyes       olopatadine 0.6 % nasal spray    PATANASE    30.5 g    USE 2 SPRAYS EVERY MORNING IN EACH NOSTRIL.    Nasal polyp, Chronic sinusitis, unspecified location, S/P FESS (functional endoscopic sinus surgery)       PROAIR  (90 Base) MCG/ACT inhaler   Generic drug:  albuterol      Inhale 1 puff into the lungs as needed        SUBLINGUAL IMMUNOTHERAPY (SLIT)     15 mL    Proceed with SLIT per standard build up schedule    Seasonal allergic rhinitis, unspecified chronicity, unspecified trigger       vitamin D3 1000 units (25 mcg) tablet    CHOLECALCIFEROL     Take 1 tablet by mouth daily.

## 2019-01-01 NOTE — LETTER
5/11/2018         RE: Maco Benavidez  [address]   [place] [address]        Dear Colleague,    Thank you for referring your patient, Maco Benavidez, to the [facility] [place]. Please see a copy of my visit note below.    Chief Complaint   Patient presents with     Other     Allergy Skin Test     He has been using Jey med rinse daily.   Reports holding Budesonide rinse due to nasal dryness.   Maco returns for recheck from his last visit. His sinuses are feeling better, and notes less pressure. However, drainage remains and may have worsened. He had felt cough improved after his last visit, and has been doing well. Sinuses feel overall controlled and asthma controlled.   Increase in itching related to holding Xyzal.        MQT  Dilution #6- Ragweed, grass, birch, cat, dust.   Dilution #5- maple, oak, anthony, cottonwood, walnut.   Dilution #2- pigweed, thistle, elm, pine, molds, dog.     Past Medical History:   Diagnosis Date     Clotting disorder (H)     Factor V Leiden, history of DVT and PE     Uncomplicated asthma         Allergies   Allergen Reactions     Aspirin      Food      Apples, nectarines......throat     Other [Seasonal Allergies]      Inhalant in type environmental     Current Outpatient Prescriptions   Medication     acetaminophen (TYLENOL) 325 MG tablet     albuterol (ALBUTEROL) 108 (90 BASE) MCG/ACT inhaler     budesonide (PULMICORT) 0.5 MG/2ML neb solution     cholecalciferol (VITAMIN D3) 1000 UNIT tablet     EPINEPHrine (EPIPEN) 0.3 MG/0.3ML injection     fish oil-omega-3 fatty acids (FISH OIL) 1000 MG capsule     Flaxseed, Linseed, (FLAXSEED OIL) 1000 MG CAPS     fluocinonide (LIDEX) 0.05 % cream     fluticasone (FLONASE) 50 MCG/ACT spray     fluticasone (FLOVENT HFA) 220 MCG/ACT inhaler     ipratropium - albuterol 0.5 mg/2.5 mg/3 mL (DUONEB) 0.5-2.5 (3) MG/3ML neb solution     levocetirizine (XYZAL) 5 MG tablet     Lutein 6 MG CAPS     montelukast (SINGULAIR) 10 MG tablet     Multiple  I saw this patient with the resident, independently confirmed the key findings of the history and physical exam, reviewed the pertinent test results, and agree with the assessment, treatment plan, and orders.    Briefly, patient is a 2 month old female, who presents for WCC.    WCC - Doing well. Mom and dad are very concerned because she started to have specks of red blood in her stools, also mixed with mucous. Has been behaving normally and stooling does not bother her. No fevers or other systemic signs. Drinking formula, now exclusively. Mom has a lot of guilt about not being able to breastfeed how she wants. Diaper examined. 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 More in depth information about formulas sent to mom in a [employer] message. Growth is okay, previously at .8% and now at .2%. Will continue to closely monitor. Discussed that adjustment to formula may improve growth as well. Counseled on infant feeding and appropriate amounts. Age appropriate vaccinations given today. Counseled on age appropriate anticipatory guidance. RTC at 4 months of age.    Please see resident note for more information.    Joelle Powell MD, MPH  [facility]      "Vitamin (DAILY MULTIVITAMIN PO)     olopatadine (PATANASE) 0.6 % nasal spray     warfarin (COUMADIN) 5 MG tablet     No current facility-administered medications for this visit.       ROS: 10 point ROS neg other than the symptoms noted above in the HPI.  /63 (BP Location: Right arm, Cuff Size: Adult Regular)  Pulse 67  Temp 97.2  F (36.2  C) (Oral)  Ht 5' 10\" (1.778 m)  Wt 165 lb (74.8 kg)  SpO2 98%  BMI 23.68 kg/m2    General - The patient is well nourished and well developed, and appears to have good nutritional status.  Alert and oriented to person and place, interactive.  Head and Face - Normocephalic and atraumatic, with no gross asymmetry noted of the contour of the facial features.  The facial nerve is intact, with strong symmetric movements.  Neck-no palpable lymphadenopathy or thyroid mass.  Trachea is midline.  Eyes - Extraocular movements intact.   Ears- External auditory canals are patent, tympanic membranes are intact without effusion or worrisome retractions   Nose - Nasal mucosa is pink and moist with no abnormal mucus.  The septum was  non-obstructive, turbinates of normal size and position. Polyps noted.   Mouth - Examination of the oral cavity shows pink, healthy, moist mucosa.  No lesions or ulceration noted.  The dentition are in good repair.  The tongue is mobile and midline.  Throat - The walls of the oropharynx were smooth, pink, moist, symmetric, and had no lesions or ulcerations.  The tonsillar pillars and soft palate were symmetric.  The uvula was midline on elevation.     ASSESSMENT:        ICD-10-CM    1. Chronic pansinusitis J32.4    2. Cough R05    3. Nasal polyp J33.9    4. Perennial allergic rhinitis with seasonal variation J30.89     J30.2    5. Chronic seasonal allergic rhinitis, unspecified trigger J30.2    6. Allergic reaction, subsequent encounter T78.40XD EPINEPHrine (EPIPEN/ADRENACLICK/OR ANY BX GENERIC EQUIV) 0.3 MG/0.3ML injection 2-pack     He took 10 mg Claritin " now  Start Xyzal tonight.   Resume Jey med rinse. Use Budesonide PRN    Epipen called in  Start SLIT  Monitor nasal/ sinus symptoms.   Return in 6 months.     Kathy Perdue PA-C  [facility] [place]  [phone]      Again, thank you for allowing me to participate in the care of your patient.        Sincerely,        Kathy Perdue PA-C

## 2019-01-16 DIAGNOSIS — J31.0 CHRONIC RHINITIS: ICD-10-CM

## 2019-01-16 DIAGNOSIS — J30.89 PERENNIAL ALLERGIC RHINITIS WITH SEASONAL VARIATION: ICD-10-CM

## 2019-01-16 DIAGNOSIS — J33.9 NASAL POLYP: ICD-10-CM

## 2019-01-16 DIAGNOSIS — J30.2 PERENNIAL ALLERGIC RHINITIS WITH SEASONAL VARIATION: ICD-10-CM

## 2019-01-16 DIAGNOSIS — J30.2 SEASONAL ALLERGIC RHINITIS: ICD-10-CM

## 2019-01-16 RX ORDER — FLUTICASONE PROPIONATE 50 MCG
SPRAY, SUSPENSION (ML) NASAL
Qty: 16 G | Refills: 1 | Status: SHIPPED | OUTPATIENT
Start: 2019-01-16 | End: 2019-04-15

## 2019-02-12 ENCOUNTER — TELEPHONE (OUTPATIENT)
Dept: OTOLARYNGOLOGY | Facility: OTHER | Age: 69
End: 2019-02-12

## 2019-02-12 NOTE — TELEPHONE ENCOUNTER
Maco requests a refill of SLIT drops.  This will be done after a signature is obtained from the ENT provider.    His last follow-up visit was 12/2018 with Shirin Medrano.  The patient is not due for an appointment at this time.    I spoke with the patient.  Heis doing fine on drops, and has current epi pens.    He will call to follow-up sometime after 7/2018-12/2018, or sooner as needed.  Mihaela Nelson RN

## 2019-05-14 ENCOUNTER — TELEPHONE (OUTPATIENT)
Dept: OTOLARYNGOLOGY | Facility: OTHER | Age: 69
End: 2019-05-14

## 2019-05-14 NOTE — TELEPHONE ENCOUNTER
Maco called requesting a SLIT refill.  I tried to reach him.  He was not home.  LM to call.  Mihaela Nelson

## 2019-05-15 NOTE — TELEPHONE ENCOUNTER
I spoke with Maco.  He is doing fine of drops, has current epi pens and will be following up this spring.  His allergies are starting this spring.  He is on his antihistamines and is rinsing his nose.  He may choose to come in early to see Kathy, should he feel it necessary.  Mihaela Nelson

## 2019-06-25 ENCOUNTER — OFFICE VISIT (OUTPATIENT)
Dept: OTOLARYNGOLOGY | Facility: OTHER | Age: 69
End: 2019-06-25
Attending: PHYSICIAN ASSISTANT
Payer: MEDICARE

## 2019-06-25 VITALS
SYSTOLIC BLOOD PRESSURE: 100 MMHG | DIASTOLIC BLOOD PRESSURE: 60 MMHG | TEMPERATURE: 95.8 F | WEIGHT: 160 LBS | HEIGHT: 70 IN | HEART RATE: 70 BPM | OXYGEN SATURATION: 94 % | BODY MASS INDEX: 22.9 KG/M2

## 2019-06-25 DIAGNOSIS — J45.40 MODERATE PERSISTENT ASTHMA, UNSPECIFIED WHETHER COMPLICATED: ICD-10-CM

## 2019-06-25 DIAGNOSIS — J30.2 PERENNIAL ALLERGIC RHINITIS WITH SEASONAL VARIATION: Primary | ICD-10-CM

## 2019-06-25 DIAGNOSIS — J32.9 CHRONIC SINUSITIS, UNSPECIFIED LOCATION: ICD-10-CM

## 2019-06-25 DIAGNOSIS — J30.89 PERENNIAL ALLERGIC RHINITIS WITH SEASONAL VARIATION: Primary | ICD-10-CM

## 2019-06-25 DIAGNOSIS — J33.9 NASAL POLYP: ICD-10-CM

## 2019-06-25 DIAGNOSIS — Z98.890 S/P FESS (FUNCTIONAL ENDOSCOPIC SINUS SURGERY): ICD-10-CM

## 2019-06-25 DIAGNOSIS — R05.9 COUGH: ICD-10-CM

## 2019-06-25 PROCEDURE — G0463 HOSPITAL OUTPT CLINIC VISIT: HCPCS

## 2019-06-25 PROCEDURE — 31231 NASAL ENDOSCOPY DX: CPT | Performed by: PHYSICIAN ASSISTANT

## 2019-06-25 PROCEDURE — 99214 OFFICE O/P EST MOD 30 MIN: CPT | Mod: 25 | Performed by: PHYSICIAN ASSISTANT

## 2019-06-25 RX ORDER — AZELASTINE HYDROCHLORIDE, FLUTICASONE PROPIONATE 137; 50 UG/1; UG/1
1 SPRAY, METERED NASAL 2 TIMES DAILY
Qty: 2 BOTTLE | Refills: 11 | Status: SHIPPED | OUTPATIENT
Start: 2019-06-25 | End: 2019-11-22

## 2019-06-25 ASSESSMENT — PAIN SCALES - GENERAL: PAINLEVEL: NO PAIN (0)

## 2019-06-25 ASSESSMENT — MIFFLIN-ST. JEOR: SCORE: 1487.01

## 2019-06-25 NOTE — PROGRESS NOTES
Chief Complaint   Patient presents with     Follow Up     SLIT       Maco returns for a recheck. He was last seen on 12/2018 and was doing well.     Maco returns for recheck.   He has been on SLIT and tolerating well.   Reports that he has been using Flonase, patanase, Singulair and Xyzal.  He has been using eye drops for his eyes at this time. He has been using PRN pending symptoms.  He does feel these have been aiding in relief.   He has been using the Budesonide rinse PRN.   He has rinsed twice a day with fair relief. Reports intermittent reduction of drainage.   Singulair and Xyzal have been fairly controlled. He feels his wheezing is related largely to his post nasal drainage.     He has tried Astelin, Qnasl, Patanase.     Epipen is up to date  Maco has been doing well with his sinuses. He feels this is stable and intermittent coughing from PND. He had improvement from the cough initially, then in October coughing for 3 weeks. His cough has improved. Maco does have good days, and coughing by the end of the week. He feels related to sleep possibly.   He has ongoing coughing spells trying to clear his throat.      Exposures to dust or irritations do worsen symptoms.   No pneumonia, URI, sinusitis.     Past Medical History:   Diagnosis Date     Clotting disorder (H)     Factor V Leiden, history of DVT and PE     Uncomplicated asthma         Allergies   Allergen Reactions     Aspirin      Food      Apples, nectarines......throat     Other [Seasonal Allergies]      Inhalant in type environmental     Current Outpatient Medications   Medication     acetaminophen (TYLENOL) 325 MG tablet     albuterol (ALBUTEROL) 108 (90 BASE) MCG/ACT inhaler     budesonide (PULMICORT) 0.5 MG/2ML neb solution     cholecalciferol (VITAMIN D3) 1000 UNIT tablet     EPINEPHrine (EPIPEN/ADRENACLICK/OR ANY BX GENERIC EQUIV) 0.3 MG/0.3ML injection 2-pack     fish oil-omega-3 fatty acids (FISH OIL) 1000 MG capsule     Flaxseed, Linseed,  "(FLAXSEED OIL) 1000 MG CAPS     fluocinonide (LIDEX) 0.05 % cream     fluticasone (FLONASE) 50 MCG/ACT nasal spray     fluticasone (FLOVENT HFA) 220 MCG/ACT inhaler     levocetirizine (XYZAL) 5 MG tablet     Lutein 6 MG CAPS     montelukast (SINGULAIR) 10 MG tablet     Multiple Vitamin (DAILY MULTIVITAMIN PO)     olopatadine (PATANASE) 0.6 % nasal spray     SUBLINGUAL IMMUNOTHERAPY, SLIT,     tamsulosin (FLOMAX) 0.4 MG capsule     warfarin (COUMADIN) 5 MG tablet     No current facility-administered medications for this visit.       ROS: 10 point ROS neg other than the symptoms noted above in the HPI.  /60   Pulse 70   Temp 95.8  F (35.4  C) (Tympanic)   Ht 1.778 m (5' 10\")   Wt 72.6 kg (160 lb)   SpO2 94%   BMI 22.96 kg/m      General - The patient is well nourished and well developed, and appears to have good nutritional status.  Alert and oriented to person and place, interactive. Appears healthy w/ fever.   Head and Face - Normocephalic and atraumatic, with no gross asymmetry noted of the contour of the facial features.  The facial nerve is intact, with strong symmetric movements.  Neck-no palpable lymphadenopathy or thyroid mass.  Trachea is midline.  Eyes - Extraocular movements intact.   Ears- External auditory canals are patent, tympanic membranes are intact without effusion or worrisome retractions   Nose - Nasal mucosa is pink and moist with no abnormal mucus.  The septum was  non-obstructive, turbinates of normal size and position. Polyps noted.   Mouth - Examination of the oral cavity shows pink, healthy, moist mucosa.  No lesions or ulceration noted.  The dentition are in good repair.  The tongue is mobile and midline.  Throat - The walls of the oropharynx were smooth, pink, moist, symmetric, and had no lesions or ulcerations.  The tonsillar pillars and soft palate were symmetric.  The uvula was midline on elevation.   Heart- Regular rate  Lungs- Expiratory wheezes, bilaterally.     To " evaluate the nose in the postoperative state I performed rigid nasal endoscopy.  I first sprayed with lidocaine and neosynephrine.  I began with the LEFT side using a 2.7mm 30 degree rigid nasal endoscope.  The middle meatus was open, and I was able to pass the scope through.  The LEFT maxillary antrostomy is open, and looking down and into the LEFT maxillary sinus, the mucosa looks healthy, no abnormal secretions.  Going further back, the ethmoid roof is nicely re-mucosalized, and there is no abnormal secretions or polypoid degeneration.      The right side was then examined.  The middle meatus was open and I visualized the RIGHT antrostomy was open.  Looking down into the RIGHT maxillary sinus, the mucosa was flat and healthy in appearance.  Going further back the ethmoid system looks good aside from polypoid change.   The ethmoid appear overall patent with small polypoid changes.   Scant allergic mucin present but overall healthy.           ASSESSMENT:      ICD-10-CM    1. Perennial allergic rhinitis with seasonal variation J30.89 azelastine-fluticasone (DYMISTA) 137-50 MCG/ACT nasal spray    J30.2    2. Nasal polyp J33.9 azelastine-fluticasone (DYMISTA) 137-50 MCG/ACT nasal spray   3. Chronic sinusitis, unspecified location J32.9 azelastine-fluticasone (DYMISTA) 137-50 MCG/ACT nasal spray   4. S/P distant FESS (functional endoscopic sinus surgery) Z98.890    5. Moderate persistent asthma, unspecified whether complicated J45.40 mometasone-formoterol (DULERA) 200-5 MCG/ACT inhaler   6. Cough R05        Continue with rinses.   Rinse twice a day.   Change Nasal spray to Dymista. 1 spray to each nostril twice a day.   Continue with Xyzal, Singulair.   He declined chest x-ray and oral antibiotic today. I discussed with Maco, his symptoms are related to his asthma. He is going to follow up with his PCP to discuss further management. He declined options today. He was informed his sinuses appear well and have very scant  allergic mucin present. He needs to address his asthma.   Change inhaler to dulera 2 puffs twice a day.   He has Albuterol PRN  Follow up with PCP for further management of asthma.            Kathy Perdue PA-C  [facility], [place]  [phone]

## 2019-06-25 NOTE — LETTER
6/25/2019         RE: Maco Benavidez  [address]  [facility] [address]        Dear Colleague,    Thank you for referring your patient, Maco Benavidez, to the [facility] [place]. Please see a copy of my visit note below.    Chief Complaint   Patient presents with     Follow Up     SLIT       Maco returns for a recheck. He was last seen on 12/2018 and was doing well.     Maco returns for recheck.   He has been on SLIT and tolerating well.   Reports that he has been using Flonase, patanase, Singulair and Xyzal.  He has been using eye drops for his eyes at this time. He has been using PRN pending symptoms.  He does feel these have been aiding in relief.   He has been using the Budesonide rinse PRN.   He has rinsed twice a day with fair relief. Reports intermittent reduction of drainage.   Singulair and Xyzal have been fairly controlled. He feels his wheezing is related largely to his post nasal drainage.     He has tried Astelin, Qnasl, Patanase.     Epipen is up to date  Maoc has been doing well with his sinuses. He feels this is stable and intermittent coughing from PND. He had improvement from the cough initially, then in October coughing for 3 weeks. His cough has improved. Maco does have good days, and coughing by the end of the week. He feels related to sleep possibly.   He has ongoing coughing spells trying to clear his throat.      Exposures to dust or irritations do worsen symptoms.   No pneumonia, URI, sinusitis.     Past Medical History:   Diagnosis Date     Clotting disorder (H)     Factor V Leiden, history of DVT and PE     Uncomplicated asthma         Allergies   Allergen Reactions     Aspirin      Food      Apples, nectarines......throat     Other [Seasonal Allergies]      Inhalant in type environmental     Current Outpatient Medications   Medication     acetaminophen (TYLENOL) 325 MG tablet     albuterol (ALBUTEROL) 108 (90 BASE) MCG/ACT inhaler     budesonide (PULMICORT) 0.5  "MG/2ML neb solution     cholecalciferol (VITAMIN D3) 1000 UNIT tablet     EPINEPHrine (EPIPEN/ADRENACLICK/OR ANY BX GENERIC EQUIV) 0.3 MG/0.3ML injection 2-pack     fish oil-omega-3 fatty acids (FISH OIL) 1000 MG capsule     Flaxseed, Linseed, (FLAXSEED OIL) 1000 MG CAPS     fluocinonide (LIDEX) 0.05 % cream     fluticasone (FLONASE) 50 MCG/ACT nasal spray     fluticasone (FLOVENT HFA) 220 MCG/ACT inhaler     levocetirizine (XYZAL) 5 MG tablet     Lutein 6 MG CAPS     montelukast (SINGULAIR) 10 MG tablet     Multiple Vitamin (DAILY MULTIVITAMIN PO)     olopatadine (PATANASE) 0.6 % nasal spray     SUBLINGUAL IMMUNOTHERAPY, SLIT,     tamsulosin (FLOMAX) 0.4 MG capsule     warfarin (COUMADIN) 5 MG tablet     No current facility-administered medications for this visit.       ROS: 10 point ROS neg other than the symptoms noted above in the HPI.  /60   Pulse 70   Temp 95.8  F (35.4  C) (Tympanic)   Ht 1.778 m (5' 10\")   Wt 72.6 kg (160 lb)   SpO2 94%   BMI 22.96 kg/m       General - The patient is well nourished and well developed, and appears to have good nutritional status.  Alert and oriented to person and place, interactive. Appears healthy w/ fever.   Head and Face - Normocephalic and atraumatic, with no gross asymmetry noted of the contour of the facial features.  The facial nerve is intact, with strong symmetric movements.  Neck-no palpable lymphadenopathy or thyroid mass.  Trachea is midline.  Eyes - Extraocular movements intact.   Ears- External auditory canals are patent, tympanic membranes are intact without effusion or worrisome retractions   Nose - Nasal mucosa is pink and moist with no abnormal mucus.  The septum was  non-obstructive, turbinates of normal size and position. Polyps noted.   Mouth - Examination of the oral cavity shows pink, healthy, moist mucosa.  No lesions or ulceration noted.  The dentition are in good repair.  The tongue is mobile and midline.  Throat - The walls of the " oropharynx were smooth, pink, moist, symmetric, and had no lesions or ulcerations.  The tonsillar pillars and soft palate were symmetric.  The uvula was midline on elevation.   Heart- Regular rate  Lungs- Expiratory wheezes, bilaterally.     To evaluate the nose in the postoperative state I performed rigid nasal endoscopy.  I first sprayed with lidocaine and neosynephrine.  I began with the LEFT side using a 2.7mm 30 degree rigid nasal endoscope.  The middle meatus was open, and I was able to pass the scope through.  The LEFT maxillary antrostomy is open, and looking down and into the LEFT maxillary sinus, the mucosa looks healthy, no abnormal secretions.  Going further back, the ethmoid roof is nicely re-mucosalized, and there is no abnormal secretions or polypoid degeneration.      The right side was then examined.  The middle meatus was open and I visualized the RIGHT antrostomy was open.  Looking down into the RIGHT maxillary sinus, the mucosa was flat and healthy in appearance.  Going further back the ethmoid system looks good aside from polypoid change.   The ethmoid appear overall patent with small polypoid changes.   Scant allergic mucin present but overall healthy.           ASSESSMENT:      ICD-10-CM    1. Perennial allergic rhinitis with seasonal variation J30.89 azelastine-fluticasone (DYMISTA) 137-50 MCG/ACT nasal spray    J30.2    2. Nasal polyp J33.9 azelastine-fluticasone (DYMISTA) 137-50 MCG/ACT nasal spray   3. Chronic sinusitis, unspecified location J32.9 azelastine-fluticasone (DYMISTA) 137-50 MCG/ACT nasal spray   4. S/P distant FESS (functional endoscopic sinus surgery) Z98.890    5. Moderate persistent asthma, unspecified whether complicated J45.40 mometasone-formoterol (DULERA) 200-5 MCG/ACT inhaler   6. Cough R05        Continue with rinses.   Rinse twice a day.   Change Nasal spray to Dymista. 1 spray to each nostril twice a day.   Continue with Xyzal, Singulair.   He declined chest x-ray  and oral antibiotic today. I discussed with Maco, his symptoms are related to his asthma. He is going to follow up with his PCP to discuss further management. He declined options today. He was informed his sinuses appear well and have very scant allergic mucin present. He needs to address his asthma.   Change inhaler to dulera 2 puffs twice a day.   He has Albuterol PRN  Follow up with PCP for further management of asthma.            Kathy Perdue PA-C  [facility]  [phone]          Again, thank you for allowing me to participate in the care of your patient.        Sincerely,        Kathy Perdue PA-C

## 2019-06-25 NOTE — NURSING NOTE
"Chief Complaint   Patient presents with     Follow Up     SLIT       Initial /60   Pulse 70   Temp 95.8  F (35.4  C) (Tympanic)   Ht 1.778 m (5' 10\")   Wt 72.6 kg (160 lb)   SpO2 94%   BMI 22.96 kg/m   Estimated body mass index is 22.96 kg/m  as calculated from the following:    Height as of this encounter: 1.778 m (5' 10\").    Weight as of this encounter: 72.6 kg (160 lb).  Medication Reconciliation: complete    Morena Amaro LPN       "

## 2019-06-25 NOTE — PATIENT INSTRUCTIONS
Continue with rinses.   Rinse twice a day.   Change Nasal spray to Dymista. 1 spray to each nostril twice a day.   Continue with Xyzal, Singulair.     Change inhaler to dulera 2 puffs twice a day.   Follow up with PCP for further management of asthma.        Thank you for allowing Kathy Perdue PA-C and our ENT team to participate in your care.  If your medications are too expensive, please give the nurse a call.  We can possibly change this medication.  If you have a scheduling or an appointment question please contact our Health Unit Coordinator at their direct line [phone].   ALL nursing questions or concerns can be directed to your ENT nurse at: [phone] [place]

## 2019-06-27 ENCOUNTER — TELEPHONE (OUTPATIENT)
Dept: ALLERGY | Facility: OTHER | Age: 69
End: 2019-06-27

## 2019-06-27 DIAGNOSIS — J30.2 PERENNIAL ALLERGIC RHINITIS WITH SEASONAL VARIATION: Primary | ICD-10-CM

## 2019-06-27 DIAGNOSIS — J30.89 PERENNIAL ALLERGIC RHINITIS WITH SEASONAL VARIATION: Primary | ICD-10-CM

## 2019-06-27 DIAGNOSIS — J30.2 SEASONAL ALLERGIC RHINITIS: ICD-10-CM

## 2019-06-27 NOTE — TELEPHONE ENCOUNTER
Patient's SLIT order has .  A new order is pended for you to sign.  Thank you.    Tanisha Ramirez RN

## 2019-07-01 ENCOUNTER — TRANSFERRED RECORDS (OUTPATIENT)
Dept: HEALTH INFORMATION MANAGEMENT | Facility: CLINIC | Age: 69
End: 2019-07-01

## 2019-07-02 ENCOUNTER — TELEPHONE (OUTPATIENT)
Dept: OTOLARYNGOLOGY | Facility: OTHER | Age: 69
End: 2019-07-02

## 2019-07-02 DIAGNOSIS — J33.9 NASAL POLYP: ICD-10-CM

## 2019-07-02 DIAGNOSIS — J30.1 SEASONAL ALLERGIC RHINITIS DUE TO POLLEN: Primary | ICD-10-CM

## 2019-07-02 DIAGNOSIS — J30.2 PERENNIAL ALLERGIC RHINITIS WITH SEASONAL VARIATION: ICD-10-CM

## 2019-07-02 DIAGNOSIS — J32.9 CHRONIC SINUSITIS, UNSPECIFIED LOCATION: ICD-10-CM

## 2019-07-02 DIAGNOSIS — J30.89 PERENNIAL ALLERGIC RHINITIS WITH SEASONAL VARIATION: ICD-10-CM

## 2019-07-02 PROBLEM — R97.20 ELEVATED PSA: Status: ACTIVE | Noted: 2017-12-01

## 2019-07-02 PROBLEM — Z91.89 10 YEAR RISK OF MI OR STROKE 7.5% OR GREATER: Status: ACTIVE | Noted: 2018-11-10

## 2019-07-02 RX ORDER — AZELASTINE 1 MG/ML
2 SPRAY, METERED NASAL ONCE
Qty: 1 BOTTLE | Refills: 11 | Status: SHIPPED | OUTPATIENT
Start: 2019-07-02 | End: 2019-11-22

## 2019-07-02 NOTE — TELEPHONE ENCOUNTER
Pt called and states that he went to  his Dymista and his Dulera, they were too expensive.  He did not pick them up.  He is using Flonase and Patanase at this time for nasal sprays.  He saw his pcp yesterday and they called in a new inhaler.  Please advise on nasal spray.  [place] [facility]

## 2019-07-03 ENCOUNTER — HOSPITAL ENCOUNTER (OUTPATIENT)
Dept: CT IMAGING | Facility: HOSPITAL | Age: 69
Discharge: HOME | End: 2019-07-03
Attending: FAMILY MEDICINE | Admitting: FAMILY MEDICINE
Payer: MEDICARE

## 2019-07-03 DIAGNOSIS — R05.9 COUGH: ICD-10-CM

## 2019-07-03 PROCEDURE — 71250 CT THORAX DX C-: CPT | Mod: TC

## 2019-07-23 DIAGNOSIS — J31.0 CHRONIC RHINITIS: ICD-10-CM

## 2019-07-23 DIAGNOSIS — J33.9 NASAL POLYP: ICD-10-CM

## 2019-07-23 DIAGNOSIS — J30.2 SEASONAL ALLERGIC RHINITIS: ICD-10-CM

## 2019-07-23 DIAGNOSIS — J30.89 PERENNIAL ALLERGIC RHINITIS WITH SEASONAL VARIATION: ICD-10-CM

## 2019-07-23 DIAGNOSIS — J30.2 PERENNIAL ALLERGIC RHINITIS WITH SEASONAL VARIATION: ICD-10-CM

## 2019-07-23 RX ORDER — FLUTICASONE PROPIONATE 50 MCG
SPRAY, SUSPENSION (ML) NASAL
Qty: 16 G | Refills: 0 | Status: SHIPPED | OUTPATIENT
Start: 2019-07-23 | End: 2019-08-29

## 2019-08-13 ENCOUNTER — TELEPHONE (OUTPATIENT)
Dept: OTOLARYNGOLOGY | Facility: OTHER | Age: 69
End: 2019-08-13

## 2019-08-13 NOTE — TELEPHONE ENCOUNTER
"Maco called to request a refill of SLIT drops.  This will be done after a signature is obtained from the ENT provider.      His last follow-up visit was 6/2019 with TIAGO Medrano.  The patient is not due for an appointment at this time.      I spoke with Maco.  He still has issues with post nasal drainage.  He also feels his lung issues are no better.  According to his last visit notes with Kathy, he had declined further management of his asthma, stating he would follow-up with his PCP.      He notes he saw Dr. Reardon and had chest xrays and CT, but never heard back with the reports.      Epic does note the CXR was done 7/1/19 and CT 7/3/19.  Results are noted in Epic as abnormal.  Results are not provided to the patient during this call.    He states Dr. Reardon \" is leaving\" so, he cant follow-up with him.  Maco has no plan as to whom he would be seeing in follow-up.         I will schedule Maco will see TIAGO Medrano.      This message is forwarded to the ENT hucs to arrange an ASAP appointment with TIAGO Medrano.    Maco agrees with this plan.     This message is forwarded to Kathy ORDOÑEZ to review and further advise.     Mihaela Nelson RN     "

## 2019-08-29 DIAGNOSIS — J30.2 PERENNIAL ALLERGIC RHINITIS WITH SEASONAL VARIATION: ICD-10-CM

## 2019-08-29 DIAGNOSIS — J33.9 NASAL POLYP: ICD-10-CM

## 2019-08-29 DIAGNOSIS — J30.89 PERENNIAL ALLERGIC RHINITIS WITH SEASONAL VARIATION: ICD-10-CM

## 2019-08-29 DIAGNOSIS — J30.2 SEASONAL ALLERGIC RHINITIS: ICD-10-CM

## 2019-08-29 DIAGNOSIS — J31.0 CHRONIC RHINITIS: ICD-10-CM

## 2019-08-30 RX ORDER — FLUTICASONE PROPIONATE 50 MCG
SPRAY, SUSPENSION (ML) NASAL
Qty: 16 G | Refills: 0 | Status: SHIPPED | OUTPATIENT
Start: 2019-08-30 | End: 2019-10-14

## 2019-09-05 ENCOUNTER — TRANSFERRED RECORDS (OUTPATIENT)
Dept: HEALTH INFORMATION MANAGEMENT | Facility: CLINIC | Age: 69
End: 2019-09-05

## 2019-09-17 ENCOUNTER — TRANSFERRED RECORDS (OUTPATIENT)
Dept: HEALTH INFORMATION MANAGEMENT | Facility: CLINIC | Age: 69
End: 2019-09-17

## 2019-10-04 ENCOUNTER — TRANSFERRED RECORDS (OUTPATIENT)
Dept: HEALTH INFORMATION MANAGEMENT | Facility: CLINIC | Age: 69
End: 2019-10-04

## 2019-10-07 ENCOUNTER — TELEPHONE (OUTPATIENT)
Dept: OTOLARYNGOLOGY | Facility: OTHER | Age: 69
End: 2019-10-07

## 2019-10-07 DIAGNOSIS — T78.40XD ALLERGIC REACTION, SUBSEQUENT ENCOUNTER: ICD-10-CM

## 2019-10-07 NOTE — TELEPHONE ENCOUNTER
Pt called and states that he is going to see the immunologist on 10/10/19.  He was told to stop his antihistamines and his allergy drops.  He is wondering if this is going to be a problem?  Please advise.

## 2019-10-08 NOTE — TELEPHONE ENCOUNTER
He should be ok. When he needs to restart his therapy. He should call and we will titrate him up. TR

## 2019-10-09 RX ORDER — EPINEPHRINE 0.3 MG/.3ML
0.3 INJECTION SUBCUTANEOUS PRN
Qty: 0.6 ML | Refills: 1 | Status: SHIPPED | OUTPATIENT
Start: 2019-10-09 | End: 2021-03-08

## 2019-10-09 NOTE — TELEPHONE ENCOUNTER
Pt was notified.  He states that he also needs a refill on his Epi-pen sent to [facility] at [facility]

## 2019-10-17 ENCOUNTER — TRANSFERRED RECORDS (OUTPATIENT)
Dept: HEALTH INFORMATION MANAGEMENT | Facility: CLINIC | Age: 69
End: 2019-10-17

## 2019-10-29 ENCOUNTER — TRANSFERRED RECORDS (OUTPATIENT)
Dept: HEALTH INFORMATION MANAGEMENT | Facility: CLINIC | Age: 69
End: 2019-10-29

## 2019-11-22 ENCOUNTER — OFFICE VISIT (OUTPATIENT)
Dept: OTOLARYNGOLOGY | Facility: OTHER | Age: 69
End: 2019-11-22
Attending: PHYSICIAN ASSISTANT
Payer: MEDICARE

## 2019-11-22 VITALS
HEART RATE: 68 BPM | BODY MASS INDEX: 22.48 KG/M2 | TEMPERATURE: 95.4 F | WEIGHT: 157 LBS | OXYGEN SATURATION: 97 % | HEIGHT: 70 IN | DIASTOLIC BLOOD PRESSURE: 80 MMHG | SYSTOLIC BLOOD PRESSURE: 124 MMHG

## 2019-11-22 DIAGNOSIS — J32.9 CHRONIC SINUSITIS, UNSPECIFIED LOCATION: ICD-10-CM

## 2019-11-22 DIAGNOSIS — Z98.890 S/P FESS (FUNCTIONAL ENDOSCOPIC SINUS SURGERY): ICD-10-CM

## 2019-11-22 DIAGNOSIS — J30.89 PERENNIAL ALLERGIC RHINITIS WITH SEASONAL VARIATION: Primary | ICD-10-CM

## 2019-11-22 DIAGNOSIS — J31.0 CHRONIC RHINITIS: ICD-10-CM

## 2019-11-22 DIAGNOSIS — J33.9 NASAL POLYP: ICD-10-CM

## 2019-11-22 DIAGNOSIS — J30.2 PERENNIAL ALLERGIC RHINITIS WITH SEASONAL VARIATION: Primary | ICD-10-CM

## 2019-11-22 PROBLEM — D68.51 FACTOR 5 LEIDEN MUTATION, HETEROZYGOUS (H): Status: ACTIVE | Noted: 2019-10-11

## 2019-11-22 PROBLEM — I83.893 VARICOSE VEINS OF BOTH LEGS WITH EDEMA: Status: ACTIVE | Noted: 2019-10-11

## 2019-11-22 PROBLEM — R76.8 ELEVATED IGE LEVEL: Status: ACTIVE | Noted: 2019-10-11

## 2019-11-22 PROBLEM — R91.8 MULTIPLE LUNG NODULES ON CT: Status: ACTIVE | Noted: 2019-10-11

## 2019-11-22 PROBLEM — Z86.711 HISTORY OF PULMONARY EMBOLISM: Status: ACTIVE | Noted: 2019-11-21

## 2019-11-22 PROCEDURE — 31231 NASAL ENDOSCOPY DX: CPT | Performed by: PHYSICIAN ASSISTANT

## 2019-11-22 PROCEDURE — 99214 OFFICE O/P EST MOD 30 MIN: CPT | Mod: 25 | Performed by: PHYSICIAN ASSISTANT

## 2019-11-22 PROCEDURE — G0463 HOSPITAL OUTPT CLINIC VISIT: HCPCS | Mod: 25

## 2019-11-22 ASSESSMENT — MIFFLIN-ST. JEOR: SCORE: 1473.4

## 2019-11-22 ASSESSMENT — PAIN SCALES - GENERAL: PAINLEVEL: NO PAIN (0)

## 2019-11-22 NOTE — NURSING NOTE
"Chief Complaint   Patient presents with     Allergies     Pt is here for a slit f/u.     Sinus Problem     Pt is here for a f/u nasal polyp.  Pt was placed on dymista.       Initial /80   Pulse 68   Temp 95.4  F (35.2  C) (Tympanic)   Ht 1.778 m (5' 10\")   Wt 71.2 kg (157 lb)   SpO2 97%   BMI 22.53 kg/m   Estimated body mass index is 22.53 kg/m  as calculated from the following:    Height as of this encounter: 1.778 m (5' 10\").    Weight as of this encounter: 71.2 kg (157 lb).  Medication Reconciliation: complete  Morena Amaro LPN  "

## 2019-11-22 NOTE — LETTER
11/22/2019         RE: Maco Benavidez  [address]  [place] [address]        Dear Colleague,    Thank you for referring your patient, Maco Benavidez, to the [facility] [place]. Please see a copy of my visit note below.    Chief Complaint   Patient presents with     Allergies     Pt is here for a slit f/u.     Sinus Problem     Pt is here for a f/u nasal polyp.  Pt was placed on dymista.       Maco returns for a recheck. He was last seen on 6/25/19 for allergies, nasal polyps.     CT scan was completed by PCP and noted changes. Referral to pulmonary.   He went to the Pulmonology for CT results. He has noted nodules on imaging. He has some that are hazy and noted to be transitory inflammation related to allergic response.   He completed PFT, CT Chest. Reports CT of chest was stable.   PFT was completed and noted to mild obstructive lung disease.   Breathing has been ok. Reports that he has some coughing present remaining that is productive. He develops sinus congestion/ nasal drip.   Denies facial pain or pressure.   He did see Allergist as well. Reported skin testing and noted to have continued positive results throughout.   He did complete a sputum culture and is pending.   He is maintaining therapy.   He will f/u with Dr. Domingo for allergies.   Currently on Flonase/ Astelin, Singulair and Xyzal.      He has been on SLIT and tolerating well.     Epipen is up to date    Exposures to dust or irritations do worsen symptoms.   No pneumonia, URI, sinusitis.        Past Medical History:   Diagnosis Date     Clotting disorder (H)     Factor V Leiden, history of DVT and PE     Uncomplicated asthma         Allergies   Allergen Reactions     Aspirin      Food      Apples, nectarines......throat     Other [Seasonal Allergies]      Inhalant in type environmental     Current Outpatient Medications   Medication     acetaminophen (TYLENOL) 325 MG tablet     albuterol (ALBUTEROL) 108 (90 BASE) MCG/ACT  "inhaler     azelastine-fluticasone (DYMISTA) 137-50 MCG/ACT nasal spray     budesonide (PULMICORT) 0.5 MG/2ML neb solution     cholecalciferol (VITAMIN D3) 1000 UNIT tablet     EPINEPHrine (EPIPEN/ADRENACLICK/OR ANY BX GENERIC EQUIV) 0.3 MG/0.3ML injection 2-pack     fish oil-omega-3 fatty acids (FISH OIL) 1000 MG capsule     Flaxseed, Linseed, (FLAXSEED OIL) 1000 MG CAPS     fluocinonide (LIDEX) 0.05 % cream     fluticasone (FLONASE) 50 MCG/ACT nasal spray     fluticasone (FLOVENT HFA) 220 MCG/ACT inhaler     levocetirizine (XYZAL) 5 MG tablet     Lutein 6 MG CAPS     mometasone-formoterol (DULERA) 200-5 MCG/ACT inhaler     montelukast (SINGULAIR) 10 MG tablet     Multiple Vitamin (DAILY MULTIVITAMIN PO)     olopatadine (PATANASE) 0.6 % nasal spray     SUBLINGUAL IMMUNOTHERAPY, SLIT,     tamsulosin (FLOMAX) 0.4 MG capsule     warfarin (COUMADIN) 5 MG tablet     No current facility-administered medications for this visit.       ROS: 10 point ROS neg other than the symptoms noted above in the HPI.  /80   Pulse 68   Temp 95.4  F (35.2  C) (Tympanic)   Ht 1.778 m (5' 10\")   Wt 71.2 kg (157 lb)   SpO2 97%   BMI 22.53 kg/m       General - The patient is well nourished and well developed, and appears to have good nutritional status.  Alert and oriented to person and place, interactive. Appears healthy w/ fever.   Head and Face - Normocephalic and atraumatic, with no gross asymmetry noted of the contour of the facial features.  The facial nerve is intact, with strong symmetric movements.  Neck-no palpable lymphadenopathy or thyroid mass.  Trachea is midline.  Eyes - Extraocular movements intact.   Ears- External auditory canals are patent, tympanic membranes are intact without effusion or worrisome retractions   Nose - Nasal mucosa is pink and moist with no abnormal mucus.  The septum was  non-obstructive, turbinates of normal size and position. Polyps noted.   Mouth - Examination of the oral cavity shows pink, " healthy, moist mucosa.  No lesions or ulceration noted.  The dentition are in good repair.  The tongue is mobile and midline.  Throat - The walls of the oropharynx were smooth, pink, moist, symmetric, and had no lesions or ulcerations.  The tonsillar pillars and soft palate were symmetric.  The uvula was midline on elevation.        To evaluate the nose in the postoperative state I performed rigid nasal endoscopy.  I first sprayed with lidocaine and neosynephrine.  I began with the LEFT side using a 2.7mm 30 degree rigid nasal endoscope.  The middle meatus was open, and I was able to pass the scope through.  The LEFT maxillary antrostomy is open, and looking down and into the LEFT maxillary sinus, the mucosa looks healthy, no abnormal secretions.  Going further back, the ethmoid roof is nicely re-mucosalized, and there is no abnormal secretions or polypoid degeneration.      The right side was then examined.  The middle meatus was open and I visualized the RIGHT antrostomy was open.  Looking down into the RIGHT maxillary sinus, the mucosa was flat and healthy in appearance.  Going further back the ethmoid system looks good aside from polypoid change.   The ethmoid appear overall patent with small polypoid changes.   Scant allergic mucin present but overall healthy.      ASSESSMENT:    ICD-10-CM    1. Perennial allergic rhinitis with seasonal variation J30.89     J30.2    2. Chronic rhinitis J31.0    3. Nasal polyp J33.9    4. Chronic sinusitis, unspecified location J32.9    5. S/P distant FESS (functional endoscopic sinus surgery) Z98.890      Continue with allergy drops.   Consider changing drops to allergy shots. Reviewed options with him today, he may notice an improvement with the transition.   Follow up with Pulmonary following sputum culture.   Continue with nasal sprays, Singulair and Xyzal.     Restart Budesonide rinse twice a day for 1 month  Sinus CT overall with mild changes. Consider surgical options if  worsening sinus complaints.       Kathy Perdue PA-C  ENT  [facility], [place]  [phone]        Again, thank you for allowing me to participate in the care of your patient.        Sincerely,        Kathy Perdue PA-C

## 2019-11-22 NOTE — PATIENT INSTRUCTIONS
Continue with allergy drops.   Consider changing drops to allergy shots    Restart Budesonide rinse twice a day for 1 month  Continue with nasal sprays, Singulair and Xyzal.     Sinus CT overall with mild changes.       Thank you for allowing Kathy Perdue PA-C and our ENT team to participate in your care.  If your medications are too expensive, please give the nurse a call.  We can possibly change this medication.  If you have a scheduling or an appointment question please contact our Health Unit Coordinator at their direct line [phone].   ALL nursing questions or concerns can be directed to your ENT nurse at: [phone] [place]

## 2019-11-22 NOTE — PROGRESS NOTES
Chief Complaint   Patient presents with     Allergies     Pt is here for a slit f/u.     Sinus Problem     Pt is here for a f/u nasal polyp.  Pt was placed on dymista.       Maco returns for a recheck. He was last seen on 6/25/19 for allergies, nasal polyps.     CT scan was completed by PCP and noted changes. Referral to pulmonary.   He went to the Pulmonology for CT results. He has noted nodules on imaging. He has some that are hazy and noted to be transitory inflammation related to allergic response.   He completed PFT, CT Chest. Reports CT of chest was stable.   PFT was completed and noted to mild obstructive lung disease.   Breathing has been ok. Reports that he has some coughing present remaining that is productive. He develops sinus congestion/ nasal drip.   Denies facial pain or pressure.   He did see Allergist as well. Reported skin testing and noted to have continued positive results throughout.   He did complete a sputum culture and is pending.   He is maintaining therapy.   He will f/u with Dr. Domingo for allergies.   Currently on Flonase/ Astelin, Singulair and Xyzal.      He has been on SLIT and tolerating well.     Epipen is up to date    Exposures to dust or irritations do worsen symptoms.   No pneumonia, URI, sinusitis.        Past Medical History:   Diagnosis Date     Clotting disorder (H)     Factor V Leiden, history of DVT and PE     Uncomplicated asthma         Allergies   Allergen Reactions     Aspirin      Food      Apples, nectarines......throat     Other [Seasonal Allergies]      Inhalant in type environmental     Current Outpatient Medications   Medication     acetaminophen (TYLENOL) 325 MG tablet     albuterol (ALBUTEROL) 108 (90 BASE) MCG/ACT inhaler     azelastine-fluticasone (DYMISTA) 137-50 MCG/ACT nasal spray     budesonide (PULMICORT) 0.5 MG/2ML neb solution     cholecalciferol (VITAMIN D3) 1000 UNIT tablet     EPINEPHrine (EPIPEN/ADRENACLICK/OR ANY BX GENERIC EQUIV) 0.3 MG/0.3ML  "injection 2-pack     fish oil-omega-3 fatty acids (FISH OIL) 1000 MG capsule     Flaxseed, Linseed, (FLAXSEED OIL) 1000 MG CAPS     fluocinonide (LIDEX) 0.05 % cream     fluticasone (FLONASE) 50 MCG/ACT nasal spray     fluticasone (FLOVENT HFA) 220 MCG/ACT inhaler     levocetirizine (XYZAL) 5 MG tablet     Lutein 6 MG CAPS     mometasone-formoterol (DULERA) 200-5 MCG/ACT inhaler     montelukast (SINGULAIR) 10 MG tablet     Multiple Vitamin (DAILY MULTIVITAMIN PO)     olopatadine (PATANASE) 0.6 % nasal spray     SUBLINGUAL IMMUNOTHERAPY, SLIT,     tamsulosin (FLOMAX) 0.4 MG capsule     warfarin (COUMADIN) 5 MG tablet     No current facility-administered medications for this visit.       ROS: 10 point ROS neg other than the symptoms noted above in the HPI.  /80   Pulse 68   Temp 95.4  F (35.2  C) (Tympanic)   Ht 1.778 m (5' 10\")   Wt 71.2 kg (157 lb)   SpO2 97%   BMI 22.53 kg/m      General - The patient is well nourished and well developed, and appears to have good nutritional status.  Alert and oriented to person and place, interactive. Appears healthy w/ fever.   Head and Face - Normocephalic and atraumatic, with no gross asymmetry noted of the contour of the facial features.  The facial nerve is intact, with strong symmetric movements.  Neck-no palpable lymphadenopathy or thyroid mass.  Trachea is midline.  Eyes - Extraocular movements intact.   Ears- External auditory canals are patent, tympanic membranes are intact without effusion or worrisome retractions   Nose - Nasal mucosa is pink and moist with no abnormal mucus.  The septum was  non-obstructive, turbinates of normal size and position. Polyps noted.   Mouth - Examination of the oral cavity shows pink, healthy, moist mucosa.  No lesions or ulceration noted.  The dentition are in good repair.  The tongue is mobile and midline.  Throat - The walls of the oropharynx were smooth, pink, moist, symmetric, and had no lesions or ulcerations.  The " tonsillar pillars and soft palate were symmetric.  The uvula was midline on elevation.        To evaluate the nose in the postoperative state I performed rigid nasal endoscopy.  I first sprayed with lidocaine and neosynephrine.  I began with the LEFT side using a 2.7mm 30 degree rigid nasal endoscope.  The middle meatus was open, and I was able to pass the scope through.  The LEFT maxillary antrostomy is open, and looking down and into the LEFT maxillary sinus, the mucosa looks healthy, no abnormal secretions.  Going further back, the ethmoid roof is nicely re-mucosalized, and there is no abnormal secretions or polypoid degeneration.      The right side was then examined.  The middle meatus was open and I visualized the RIGHT antrostomy was open.  Looking down into the RIGHT maxillary sinus, the mucosa was flat and healthy in appearance.  Going further back the ethmoid system looks good aside from polypoid change.   The ethmoid appear overall patent with small polypoid changes.   Scant allergic mucin present but overall healthy.      ASSESSMENT:    ICD-10-CM    1. Perennial allergic rhinitis with seasonal variation J30.89     J30.2    2. Chronic rhinitis J31.0    3. Nasal polyp J33.9    4. Chronic sinusitis, unspecified location J32.9    5. S/P distant FESS (functional endoscopic sinus surgery) Z98.890      Continue with allergy drops.   Consider changing drops to allergy shots. Reviewed options with him today, he may notice an improvement with the transition.   Follow up with Pulmonary following sputum culture.   Continue with nasal sprays, Singulair and Xyzal.     Restart Budesonide rinse twice a day for 1 month  Sinus CT overall with mild changes. Consider surgical options if worsening sinus complaints.       Kathy Perdue PA-C  ENT  [facility], [place]  [phone]

## 2020-01-10 DIAGNOSIS — J30.89 PERENNIAL ALLERGIC RHINITIS WITH SEASONAL VARIATION: ICD-10-CM

## 2020-01-10 DIAGNOSIS — J33.9 NASAL POLYP: ICD-10-CM

## 2020-01-10 DIAGNOSIS — J31.0 CHRONIC RHINITIS: ICD-10-CM

## 2020-01-10 DIAGNOSIS — J30.2 PERENNIAL ALLERGIC RHINITIS WITH SEASONAL VARIATION: ICD-10-CM

## 2020-01-14 RX ORDER — FLUTICASONE PROPIONATE 50 MCG
SPRAY, SUSPENSION (ML) NASAL
Qty: 16 G | Refills: 0 | Status: SHIPPED | OUTPATIENT
Start: 2020-01-14 | End: 2020-03-13

## 2020-01-27 DIAGNOSIS — J30.2 SEASONAL ALLERGIC RHINITIS, UNSPECIFIED TRIGGER: ICD-10-CM

## 2020-01-27 DIAGNOSIS — J30.2 PERENNIAL ALLERGIC RHINITIS WITH SEASONAL VARIATION: ICD-10-CM

## 2020-01-27 DIAGNOSIS — J30.89 PERENNIAL ALLERGIC RHINITIS WITH SEASONAL VARIATION: ICD-10-CM

## 2020-01-30 NOTE — TELEPHONE ENCOUNTER
xyzal      Last Written Prescription Date:  12/23/19  Last Fill Quantity: 30,   # refills: 0  Last Office Visit: 11/22/19  Future Office visit:       singulair      Last Written Prescription Date:  12/23/19  Last Fill Quantity: 30,   # refills: 0  Last Office Visit: 11/22/19  Future Office visit:

## 2020-01-31 RX ORDER — LEVOCETIRIZINE DIHYDROCHLORIDE 5 MG/1
TABLET, FILM COATED ORAL
Qty: 30 TABLET | Refills: 1 | Status: SHIPPED | OUTPATIENT
Start: 2020-01-31 | End: 2020-03-11

## 2020-01-31 RX ORDER — MONTELUKAST SODIUM 10 MG/1
TABLET ORAL
Qty: 30 TABLET | Refills: 1 | Status: SHIPPED | OUTPATIENT
Start: 2020-01-31 | End: 2020-04-14

## 2020-02-26 ENCOUNTER — TELEPHONE (OUTPATIENT)
Dept: ALLERGY | Facility: OTHER | Age: 70
End: 2020-02-26

## 2020-02-26 NOTE — TELEPHONE ENCOUNTER
Attempted to reach patient regarding SLIT refill, no answer.  Phone kept ringing, but no voicemail.  Will try again at another time.    Tanisha Ramirez RN

## 2020-02-27 NOTE — TELEPHONE ENCOUNTER
Spoke with patient regarding SLIT refill.  Patient is not having issues on drops, and his epi-pens are current.  Patient is not due for a follow-up until May.  Will process refill request.    Tanisha Ramirez RN

## 2020-03-05 DIAGNOSIS — J30.2 PERENNIAL ALLERGIC RHINITIS WITH SEASONAL VARIATION: ICD-10-CM

## 2020-03-05 DIAGNOSIS — J30.89 PERENNIAL ALLERGIC RHINITIS WITH SEASONAL VARIATION: ICD-10-CM

## 2020-03-05 DIAGNOSIS — J30.2 SEASONAL ALLERGIC RHINITIS, UNSPECIFIED TRIGGER: ICD-10-CM

## 2020-03-10 DIAGNOSIS — J30.89 PERENNIAL ALLERGIC RHINITIS WITH SEASONAL VARIATION: ICD-10-CM

## 2020-03-10 DIAGNOSIS — J33.9 NASAL POLYP: ICD-10-CM

## 2020-03-10 DIAGNOSIS — J31.0 CHRONIC RHINITIS: ICD-10-CM

## 2020-03-10 DIAGNOSIS — J30.2 PERENNIAL ALLERGIC RHINITIS WITH SEASONAL VARIATION: ICD-10-CM

## 2020-03-10 NOTE — TELEPHONE ENCOUNTER
levocetirizine 5 MG      Last Written Prescription Date:  1-  Last Fill Quantity: 30,   # refills: 1  Last Office Visit: 11-22-19  Future Office visit:       Routing refill request to provider for review/approval because:

## 2020-03-10 NOTE — TELEPHONE ENCOUNTER
Protocol failed due to    Patient is 3-64 years of age     LOV 11/22/19 with Kathy. Please advise. Thank you!

## 2020-03-11 RX ORDER — LEVOCETIRIZINE DIHYDROCHLORIDE 5 MG/1
TABLET, FILM COATED ORAL
Qty: 30 TABLET | Refills: 0 | Status: SHIPPED | OUTPATIENT
Start: 2020-03-11 | End: 2020-04-14

## 2020-03-12 NOTE — TELEPHONE ENCOUNTER
Fluticasone (Flonase) 50 mcg / act nasal spray  Instill 2 sprays to each nostril once daily  Last Written Prescription Date:  1-14-20  Last Fill Quantity: 16 g,   # refills: 0  Last Office Visit: 11-22-19  Future Office visit:

## 2020-03-13 RX ORDER — FLUTICASONE PROPIONATE 50 MCG
SPRAY, SUSPENSION (ML) NASAL
Qty: 16 G | Refills: 0 | Status: SHIPPED | OUTPATIENT
Start: 2020-03-13 | End: 2020-04-24

## 2020-04-10 DIAGNOSIS — J30.89 PERENNIAL ALLERGIC RHINITIS WITH SEASONAL VARIATION: ICD-10-CM

## 2020-04-10 DIAGNOSIS — J30.2 SEASONAL ALLERGIC RHINITIS, UNSPECIFIED TRIGGER: ICD-10-CM

## 2020-04-10 DIAGNOSIS — J30.2 PERENNIAL ALLERGIC RHINITIS WITH SEASONAL VARIATION: ICD-10-CM

## 2020-04-13 NOTE — TELEPHONE ENCOUNTER
Singulair  Last Written Prescription Date: 1/31/20  Last Fill Quantity: 30 # of Refills: 1  Last Office Visit: 11/22/19    Xyzal  Last Written Prescription Date: 3/11/20  Last Fill Quantity: 30 # of Refills: 0  Last Office Visit: 11/22/19

## 2020-04-14 RX ORDER — MONTELUKAST SODIUM 10 MG/1
TABLET ORAL
Qty: 30 TABLET | Refills: 0 | Status: SHIPPED | OUTPATIENT
Start: 2020-04-14 | End: 2020-06-01

## 2020-04-14 RX ORDER — LEVOCETIRIZINE DIHYDROCHLORIDE 5 MG/1
TABLET, FILM COATED ORAL
Qty: 30 TABLET | Refills: 0 | Status: SHIPPED | OUTPATIENT
Start: 2020-04-14 | End: 2020-05-12

## 2020-04-23 DIAGNOSIS — J30.2 PERENNIAL ALLERGIC RHINITIS WITH SEASONAL VARIATION: ICD-10-CM

## 2020-04-23 DIAGNOSIS — J33.9 NASAL POLYP: ICD-10-CM

## 2020-04-23 DIAGNOSIS — J31.0 CHRONIC RHINITIS: ICD-10-CM

## 2020-04-23 DIAGNOSIS — J30.89 PERENNIAL ALLERGIC RHINITIS WITH SEASONAL VARIATION: ICD-10-CM

## 2020-04-23 NOTE — TELEPHONE ENCOUNTER
fluticasone (FLONASE) 50 MCG/ACT nasal spray       Last Written Prescription Date:  3/13/20  Last Fill Quantity: 16g,   # refills: 0  Last Office Visit: 11/21/19  Future Office visit:       Routing refill request to provider for review/approval because:  Drug not on the [facility], [facility] or [facility] refill protocol or controlled substance

## 2020-04-24 RX ORDER — FLUTICASONE PROPIONATE 50 MCG
SPRAY, SUSPENSION (ML) NASAL
Qty: 16 G | Refills: 0 | Status: SHIPPED | OUTPATIENT
Start: 2020-04-24 | End: 2021-06-29

## 2020-05-11 DIAGNOSIS — J30.89 PERENNIAL ALLERGIC RHINITIS WITH SEASONAL VARIATION: ICD-10-CM

## 2020-05-11 DIAGNOSIS — J30.2 SEASONAL ALLERGIC RHINITIS, UNSPECIFIED TRIGGER: ICD-10-CM

## 2020-05-11 DIAGNOSIS — J30.2 PERENNIAL ALLERGIC RHINITIS WITH SEASONAL VARIATION: ICD-10-CM

## 2020-05-12 DIAGNOSIS — J30.89 PERENNIAL ALLERGIC RHINITIS WITH SEASONAL VARIATION: ICD-10-CM

## 2020-05-12 DIAGNOSIS — J30.2 PERENNIAL ALLERGIC RHINITIS WITH SEASONAL VARIATION: ICD-10-CM

## 2020-05-12 DIAGNOSIS — J30.2 SEASONAL ALLERGIC RHINITIS, UNSPECIFIED TRIGGER: ICD-10-CM

## 2020-05-12 RX ORDER — LEVOCETIRIZINE DIHYDROCHLORIDE 5 MG/1
TABLET, FILM COATED ORAL
Qty: 30 TABLET | Refills: 0 | OUTPATIENT
Start: 2020-05-12

## 2020-05-12 RX ORDER — MONTELUKAST SODIUM 10 MG/1
TABLET ORAL
Qty: 30 TABLET | Refills: 0 | OUTPATIENT
Start: 2020-05-12

## 2020-05-12 NOTE — TELEPHONE ENCOUNTER
levocetirizine (XYZAL) 5 MG tablet       Last Written Prescription Date:  4/14/20  Last Fill Quantity: 30,   # refills: 0  Last Office Visit: 11/22/19 with Kathy  Future Office visit:       Routing refill request to provider for review/approval because:      Antihistamines Protocol [id number]/12/2020 03:18 PM   Patient is 3-64 years of age

## 2020-05-13 RX ORDER — LEVOCETIRIZINE DIHYDROCHLORIDE 5 MG/1
TABLET, FILM COATED ORAL
Qty: 30 TABLET | Refills: 0 | Status: SHIPPED | OUTPATIENT
Start: 2020-05-13 | End: 2020-06-01

## 2020-06-01 DIAGNOSIS — J30.2 PERENNIAL ALLERGIC RHINITIS WITH SEASONAL VARIATION: ICD-10-CM

## 2020-06-01 DIAGNOSIS — J30.89 PERENNIAL ALLERGIC RHINITIS WITH SEASONAL VARIATION: ICD-10-CM

## 2020-06-01 DIAGNOSIS — J30.2 SEASONAL ALLERGIC RHINITIS, UNSPECIFIED TRIGGER: ICD-10-CM

## 2020-06-01 RX ORDER — LEVOCETIRIZINE DIHYDROCHLORIDE 5 MG/1
TABLET, FILM COATED ORAL
Qty: 60 TABLET | Refills: 0 | Status: SHIPPED | OUTPATIENT
Start: 2020-06-01 | End: 2021-06-04

## 2020-06-01 RX ORDER — MONTELUKAST SODIUM 10 MG/1
1 TABLET ORAL AT BEDTIME
Qty: 90 TABLET | Refills: 0 | Status: SHIPPED | OUTPATIENT
Start: 2020-06-01 | End: 2023-05-17

## 2020-06-01 NOTE — TELEPHONE ENCOUNTER
Maco called for SLIT refill.  He is doing fine on drops, and has current epi pens.  He will be scheduled for his 6 month follow-up with an ENT provider.  I will have the [facility] arrange this.    His SLIT refill will be signed by ENT and faxed to [facility].    He is also requesting a refill of his Xyzal and Singulair.  A 3 month supply is sent to his pharmacy per protocol.  He did receive a 30 day supply of xyzal about 2 weeks ago, but this was refilled given his continual use.   Mihaela Nelson RN

## 2020-06-04 ENCOUNTER — VIRTUAL VISIT (OUTPATIENT)
Dept: OTOLARYNGOLOGY | Facility: OTHER | Age: 70
End: 2020-06-04
Attending: NURSE PRACTITIONER
Payer: COMMERCIAL

## 2020-06-04 VITALS — WEIGHT: 155 LBS | HEIGHT: 70 IN | BODY MASS INDEX: 22.19 KG/M2

## 2020-06-04 DIAGNOSIS — J33.9 NASAL POLYP: ICD-10-CM

## 2020-06-04 DIAGNOSIS — J32.9 CHRONIC SINUSITIS, UNSPECIFIED LOCATION: ICD-10-CM

## 2020-06-04 DIAGNOSIS — J33.9 DISORDER OF RESPIRATORY SYSTEM EXACERBATED BY ASPIRIN: ICD-10-CM

## 2020-06-04 DIAGNOSIS — J30.2 PERENNIAL ALLERGIC RHINITIS WITH SEASONAL VARIATION: Primary | ICD-10-CM

## 2020-06-04 DIAGNOSIS — Z88.6 DISORDER OF RESPIRATORY SYSTEM EXACERBATED BY ASPIRIN: ICD-10-CM

## 2020-06-04 DIAGNOSIS — J45.909 DISORDER OF RESPIRATORY SYSTEM EXACERBATED BY ASPIRIN: ICD-10-CM

## 2020-06-04 DIAGNOSIS — J30.89 PERENNIAL ALLERGIC RHINITIS WITH SEASONAL VARIATION: Primary | ICD-10-CM

## 2020-06-04 DIAGNOSIS — Z98.890 S/P FESS (FUNCTIONAL ENDOSCOPIC SINUS SURGERY): ICD-10-CM

## 2020-06-04 PROCEDURE — 99214 OFFICE O/P EST MOD 30 MIN: CPT | Mod: 95 | Performed by: NURSE PRACTITIONER

## 2020-06-04 RX ORDER — MONTELUKAST SODIUM 10 MG/1
10 TABLET ORAL AT BEDTIME
Qty: 90 TABLET | Refills: 3 | Status: SHIPPED | OUTPATIENT
Start: 2020-06-04 | End: 2021-04-27

## 2020-06-04 RX ORDER — AZELASTINE 1 MG/ML
2 SPRAY, METERED NASAL DAILY
COMMUNITY
End: 2020-08-07

## 2020-06-04 RX ORDER — FLUTICASONE PROPIONATE 50 MCG
2 SPRAY, SUSPENSION (ML) NASAL DAILY
Qty: 16 G | Refills: 12 | Status: SHIPPED | OUTPATIENT
Start: 2020-06-04 | End: 2020-07-04

## 2020-06-04 RX ORDER — ETHAMBUTOL HYDROCHLORIDE 400 MG/1
400 TABLET, FILM COATED ORAL DAILY
COMMUNITY
End: 2023-05-17

## 2020-06-04 RX ORDER — LEVOCETIRIZINE DIHYDROCHLORIDE 5 MG/1
5 TABLET, FILM COATED ORAL EVERY EVENING
Qty: 90 TABLET | Refills: 3 | Status: SHIPPED | OUTPATIENT
Start: 2020-06-04 | End: 2021-04-27

## 2020-06-04 RX ORDER — AZITHROMYCIN 500 MG/1
500 TABLET, FILM COATED ORAL DAILY
COMMUNITY
End: 2023-05-17

## 2020-06-04 RX ORDER — RIFAMPIN 300 MG/1
300 CAPSULE ORAL
COMMUNITY
End: 2023-05-17

## 2020-06-04 ASSESSMENT — MIFFLIN-ST. JEOR: SCORE: 1459.33

## 2020-06-04 ASSESSMENT — PAIN SCALES - GENERAL: PAINLEVEL: NO PAIN (0)

## 2020-06-04 NOTE — NURSING NOTE
"Chief Complaint   Patient presents with     Follow Up     SLIT       Initial Ht 1.778 m (5' 10\")   Wt 70.3 kg (155 lb)   BMI 22.24 kg/m   Estimated body mass index is 22.24 kg/m  as calculated from the following:    Height as of this encounter: 1.778 m (5' 10\").    Weight as of this encounter: 70.3 kg (155 lb).  Medication Reconciliation: complete  ELPIDIO TRINIDAD LPN    "

## 2020-06-04 NOTE — LETTER
"    6/4/2020         RE: Maco Benavidez  [address]  [facility] [address]        Dear Colleague,    Thank you for referring your patient, Maco Benavidez, to the [facility]. Please see a copy of my visit note below.    Maco Benavidez is a 70 year old male who is being evaluated via a billable telephone visit.      The patient has been notified of following:     \"This telephone visit will be conducted via a call between you and your physician/provider. We have found that certain health care needs can be provided without the need for a physical exam.  This service lets us provide the care you need with a short phone conversation.  If a prescription is necessary we can send it directly to your pharmacy.  If lab work is needed we can place an order for that and you can then stop by our lab to have the test done at a later time.    Telephone visits are billed at different rates depending on your insurance coverage. During this emergency period, for some insurers they may be billed the same as an in-person visit.  Please reach out to your insurance provider with any questions.    If during the course of the call the physician/provider feels a telephone visit is not appropriate, you will not be charged for this service.\"    Patient has given verbal consent for Telephone visit?  Yes    What phone number would you like to be contacted at? [phone]    How would you like to obtain your AVS? Mail a copy   Chief Complaint   Patient presents with     Follow Up     SLIT     Maco has a history of asthma and eczema, AERD with nasal polyps in the past and OAS.  He also had a CT chest in July 2019 that showed pulmonary nodules.  As a result he was sent to Pulmonology for work up.  PFT and sputum cultures were completed.  He was diagnosed with Mycobacterium Infection.  He was seen by ID and started on Rifampin, Azith and ethambutol.  He has been giving sputum samples, last sputum was negative. The plan is to " continue 1 year of treatment after negative sample.     He has been doing SLIT    He has been doing 3 drops per day and tolerating well.    Has been doing well with drops.  Denies oral itching/swelling    Eczema has been worsening recently, he uses Lidex cream 1 week on, 1 week off.  He follows with Dermatology.      Has noticed improvement with allergy symptoms.  He has some PND with scratchy voice off and on.  He reports that last year is much better than last year.  He has been using hortensia med rinses daily and after exposure.  He also washes his face at bedtime after exposure.    Has been using Xyzal, Singulair, Astelin and Flonase for symptomatic relief and has been tolerating well.     He denies trouble breathing through his nose.    He reports his sinuses are feeling best that they have been in a long time.      He reports occasional snoring, no eric apnea.   He has trouble waking in the morning at times, but feels it is only because he is retired and has no where to go.  He denies morning headaches and daytime somnolence.       Does see relief with budesonide rinses - used in March and April and has not been using since.     No eric sinusitis  No recent illnesses or sinus infections    Epi pen is up to date    Asthma Control Test:  In the past 4 weeks, home much of the time did your asthma keep you from getting as much done at work, school or at home? 5 None of the time  During the past 4 weeks, how often have you had shortness of breath? 4 Once or twice a week  During the past 4 weeks, how often did your asthma symptoms (wheezing, coughing, shortness of breath, chest tightness or pain) wake you up at night or earlier than usual in the morning? 5 Not at all  During the past 4 weeks, how often have you used your rescue inhaler or nebulizer medication (such as albuterol)? 5 Not at all  How would you rate your asthma control during the past 4 weeks? 4 Well controlled  Total Score: 22    He notes he followed  with pulmonology and flovent was decreased.    We briefly discussed biologics including Dupixent.  With his history of Mycobacterium infection he would not be a good candidate for biologic treatment at this time.      Allergies   Allergen Reactions     Aspirin      Food      Apples, nectarines......throat     Other [Seasonal Allergies]      Inhalant in type environmental     Current Outpatient Medications   Medication     albuterol (ALBUTEROL) 108 (90 BASE) MCG/ACT inhaler     azelastine (ASTELIN) 0.1 % nasal spray     azithromycin (ZITHROMAX) 500 MG tablet     budesonide (PULMICORT) 0.5 MG/2ML neb solution     cholecalciferol (VITAMIN D3) 1000 UNIT tablet     EPINEPHrine (EPIPEN/ADRENACLICK/OR ANY BX GENERIC EQUIV) 0.3 MG/0.3ML injection 2-pack     ethambutol (MYAMBUTOL) 400 MG tablet     fish oil-omega-3 fatty acids (FISH OIL) 1000 MG capsule     Flaxseed, Linseed, (FLAXSEED OIL) 1000 MG CAPS     fluocinonide (LIDEX) 0.05 % cream     fluticasone (FLONASE) 50 MCG/ACT nasal spray     fluticasone (FLONASE) 50 MCG/ACT nasal spray     fluticasone (FLOVENT HFA) 220 MCG/ACT inhaler     levocetirizine (XYZAL) 5 MG tablet     levocetirizine (XYZAL) 5 MG tablet     Lutein 6 MG CAPS     montelukast (SINGULAIR) 10 MG tablet     montelukast (SINGULAIR) 10 MG tablet     Multiple Vitamin (DAILY MULTIVITAMIN PO)     rifampin (RIFADIN) 300 MG capsule     SUBLINGUAL IMMUNOTHERAPY, SLIT,     warfarin (COUMADIN) 5 MG tablet     No current facility-administered medications for this visit.      Past Medical History:   Diagnosis Date     Clotting disorder (H)     Factor V Leiden, history of DVT and PE     Uncomplicated asthma      Past Surgical History:   Procedure Laterality Date     COLONOSCOPY N/A 5/4/2018    Procedure: COLONOSCOPY;  COLONOSCOPY;  Surgeon: Jc Reardon MD;  Location: [facility] OR     COLONOSCOPY - HIM SCAN  09/13/2006    Colonoscopy, Flex, Diagnostic.  Repeat 2016;  No polyps identified.  Moderate sized hemorrhoids,  No diverticular disease.  He can go ten years before his next colonoscopy     FLEXIBLE SIGMOIDOSCOPY - HIM SCAN  09/05/1996    Sigmoidoscopy, biopsy, flex sig, with biopsy;  within normal limits     HERNIA REPAIR       Nasal Polypectomy x3       Social History     Tobacco Use     Smoking status: Never Smoker     Smokeless tobacco: Never Used   Substance Use Topics     Alcohol use: Yes     Comment: beer occasionally     Drug use: No     General - Alert and oriented to person and place, answers questions and cooperates with telephone appointment appropriately.   Voice and Breathing - The patient was breathing comfortably, talking in full sentences. There was no audible wheezing, stridor, or stertor.  The patients voice was clear and strong, and had appropriate pitch and quality.    ASSESSMENT:      ICD-10-CM    1. Perennial allergic rhinitis with seasonal variation  J30.89 montelukast (SINGULAIR) 10 MG tablet    J30.2 levocetirizine (XYZAL) 5 MG tablet     fluticasone (FLONASE) 50 MCG/ACT nasal spray   2. Nasal polyp  J33.9 montelukast (SINGULAIR) 10 MG tablet     levocetirizine (XYZAL) 5 MG tablet     fluticasone (FLONASE) 50 MCG/ACT nasal spray   3. Chronic sinusitis, unspecified location  J32.9 montelukast (SINGULAIR) 10 MG tablet     levocetirizine (XYZAL) 5 MG tablet     fluticasone (FLONASE) 50 MCG/ACT nasal spray   4. S/P distant FESS (functional endoscopic sinus surgery)  Z98.890    5. Disorder of respiratory system exacerbated by aspirin  J98.4     T39.015A      Continue Singulair 10 mg daily - a refill was sent to your pharmacy  Continue xyzal 5 mg daily  Continue Astelin and flonase sprays daily  Continue rifampin, Azithromycin, and ethambutol as prescribed by Infectious Diseases  Keep scheduled follow ups with Dermatology, Pulmonology and Infectious Diseases  Follow up in ENT in 6-9 months for routine exam, sooner as needed for change in symptoms, worsening or symptoms or new concerns.      Geovanna  William HAMEED  9:23 AM  June 4, 2020      Phone call duration: 33 minutes          Again, thank you for allowing me to participate in the care of your patient.        Sincerely,        Geovanna Thomas NP

## 2020-06-04 NOTE — PATIENT INSTRUCTIONS
Thank you for allowing Geovanna HAMEED and our ENT team to participate in your care.  If your medications are too expensive, please call my nurse at the number listed below.  We can possibly change this medication.    If you have a scheduling or an appointment question please contact our Health Unit Coordinator at their direct line [phone].   ALL nursing questions or concerns can be directed to your ENT nurse at: [phone] Magy    Continue Singulair 10 mg daily - a refill was sent to your pharmacy  Continue xyzal 5 mg daily  Continue Astelin and flonase sprays daily  Continue rifampin, Azithromycin, and ethambutol as prescribed by Infectious Diseases  Keep scheduled follow ups with Dermatology, Pulmonology and Infectious Diseases  Follow up in ENT in 6-9 months for routine exam, sooner as needed for change in symptoms, worsening or symptoms or new concerns.

## 2020-06-04 NOTE — PROGRESS NOTES
"Maco Benavidez is a 70 year old male who is being evaluated via a billable telephone visit.      The patient has been notified of following:     \"This telephone visit will be conducted via a call between you and your physician/provider. We have found that certain health care needs can be provided without the need for a physical exam.  This service lets us provide the care you need with a short phone conversation.  If a prescription is necessary we can send it directly to your pharmacy.  If lab work is needed we can place an order for that and you can then stop by our lab to have the test done at a later time.    Telephone visits are billed at different rates depending on your insurance coverage. During this emergency period, for some insurers they may be billed the same as an in-person visit.  Please reach out to your insurance provider with any questions.    If during the course of the call the physician/provider feels a telephone visit is not appropriate, you will not be charged for this service.\"    Patient has given verbal consent for Telephone visit?  Yes    What phone number would you like to be contacted at? [phone]    How would you like to obtain your AVS? Mail a copy   Chief Complaint   Patient presents with     Follow Up     SLIT     Maco has a history of asthma and eczema, AERD with nasal polyps in the past and OAS.  He also had a CT chest in July 2019 that showed pulmonary nodules.  As a result he was sent to Pulmonology for work up.  PFT and sputum cultures were completed.  He was diagnosed with Mycobacterium Infection.  He was seen by ID and started on Rifampin, Azith and ethambutol.  He has been giving sputum samples, last sputum was negative. The plan is to continue 1 year of treatment after negative sample.     He has been doing SLIT    He has been doing 3 drops per day and tolerating well.    Has been doing well with drops.  Denies oral itching/swelling    Eczema has been worsening recently, " he uses Lidex cream 1 week on, 1 week off.  He follows with Dermatology.      Has noticed improvement with allergy symptoms.  He has some PND with scratchy voice off and on.  He reports that last year is much better than last year.  He has been using hortensia med rinses daily and after exposure.  He also washes his face at bedtime after exposure.    Has been using Xyzal, Singulair, Astelin and Flonase for symptomatic relief and has been tolerating well.     He denies trouble breathing through his nose.    He reports his sinuses are feeling best that they have been in a long time.      He reports occasional snoring, no eric apnea.   He has trouble waking in the morning at times, but feels it is only because he is retired and has no where to go.  He denies morning headaches and daytime somnolence.       Does see relief with budesonide rinses - used in March and April and has not been using since.     No eric sinusitis  No recent illnesses or sinus infections    Epi pen is up to date    Asthma Control Test:  In the past 4 weeks, home much of the time did your asthma keep you from getting as much done at work, school or at home? 5 None of the time  During the past 4 weeks, how often have you had shortness of breath? 4 Once or twice a week  During the past 4 weeks, how often did your asthma symptoms (wheezing, coughing, shortness of breath, chest tightness or pain) wake you up at night or earlier than usual in the morning? 5 Not at all  During the past 4 weeks, how often have you used your rescue inhaler or nebulizer medication (such as albuterol)? 5 Not at all  How would you rate your asthma control during the past 4 weeks? 4 Well controlled  Total Score: 22    He notes he followed with pulmonology and flovent was decreased.    We briefly discussed biologics including Dupixent.  With his history of Mycobacterium infection he would not be a good candidate for biologic treatment at this time.      Allergies   Allergen  Reactions     Aspirin      Food      Apples, nectarines......throat     Other [Seasonal Allergies]      Inhalant in type environmental     Current Outpatient Medications   Medication     albuterol (ALBUTEROL) 108 (90 BASE) MCG/ACT inhaler     azelastine (ASTELIN) 0.1 % nasal spray     azithromycin (ZITHROMAX) 500 MG tablet     budesonide (PULMICORT) 0.5 MG/2ML neb solution     cholecalciferol (VITAMIN D3) 1000 UNIT tablet     EPINEPHrine (EPIPEN/ADRENACLICK/OR ANY BX GENERIC EQUIV) 0.3 MG/0.3ML injection 2-pack     ethambutol (MYAMBUTOL) 400 MG tablet     fish oil-omega-3 fatty acids (FISH OIL) 1000 MG capsule     Flaxseed, Linseed, (FLAXSEED OIL) 1000 MG CAPS     fluocinonide (LIDEX) 0.05 % cream     fluticasone (FLONASE) 50 MCG/ACT nasal spray     fluticasone (FLONASE) 50 MCG/ACT nasal spray     fluticasone (FLOVENT HFA) 220 MCG/ACT inhaler     levocetirizine (XYZAL) 5 MG tablet     levocetirizine (XYZAL) 5 MG tablet     Lutein 6 MG CAPS     montelukast (SINGULAIR) 10 MG tablet     montelukast (SINGULAIR) 10 MG tablet     Multiple Vitamin (DAILY MULTIVITAMIN PO)     rifampin (RIFADIN) 300 MG capsule     SUBLINGUAL IMMUNOTHERAPY, SLIT,     warfarin (COUMADIN) 5 MG tablet     No current facility-administered medications for this visit.      Past Medical History:   Diagnosis Date     Clotting disorder (H)     Factor V Leiden, history of DVT and PE     Uncomplicated asthma      Past Surgical History:   Procedure Laterality Date     COLONOSCOPY N/A 5/4/2018    Procedure: COLONOSCOPY;  COLONOSCOPY;  Surgeon: Jc Reardon MD;  Location: [facility] OR     COLONOSCOPY - HIM SCAN  09/13/2006    Colonoscopy, Flex, Diagnostic.  Repeat 2016;  No polyps identified.  Moderate sized hemorrhoids, No diverticular disease.  He can go ten years before his next colonoscopy     FLEXIBLE SIGMOIDOSCOPY - [facility] SCAN  09/05/1996    Sigmoidoscopy, biopsy, flex sig, with biopsy;  within normal limits     HERNIA REPAIR       Nasal Polypectomy  x3       Social History     Tobacco Use     Smoking status: Never Smoker     Smokeless tobacco: Never Used   Substance Use Topics     Alcohol use: Yes     Comment: beer occasionally     Drug use: No     General - Alert and oriented to person and place, answers questions and cooperates with telephone appointment appropriately.   Voice and Breathing - The patient was breathing comfortably, talking in full sentences. There was no audible wheezing, stridor, or stertor.  The patients voice was clear and strong, and had appropriate pitch and quality.    ASSESSMENT:      ICD-10-CM    1. Perennial allergic rhinitis with seasonal variation  J30.89 montelukast (SINGULAIR) 10 MG tablet    J30.2 levocetirizine (XYZAL) 5 MG tablet     fluticasone (FLONASE) 50 MCG/ACT nasal spray   2. Nasal polyp  J33.9 montelukast (SINGULAIR) 10 MG tablet     levocetirizine (XYZAL) 5 MG tablet     fluticasone (FLONASE) 50 MCG/ACT nasal spray   3. Chronic sinusitis, unspecified location  J32.9 montelukast (SINGULAIR) 10 MG tablet     levocetirizine (XYZAL) 5 MG tablet     fluticasone (FLONASE) 50 MCG/ACT nasal spray   4. S/P distant FESS (functional endoscopic sinus surgery)  Z98.890    5. Disorder of respiratory system exacerbated by aspirin  J98.4     T39.015A      Continue Singulair 10 mg daily - a refill was sent to your pharmacy  Continue xyzal 5 mg daily  Continue Astelin and flonase sprays daily  Continue rifampin, Azithromycin, and ethambutol as prescribed by Infectious Diseases  Keep scheduled follow ups with Dermatology, Pulmonology and Infectious Diseases  Follow up in ENT in 6-9 months for routine exam, sooner as needed for change in symptoms, worsening or symptoms or new concerns.      Geovanna HAMEED  9:23 AM  June 4, 2020      Phone call duration: 33 minutes

## 2020-07-23 ENCOUNTER — TELEPHONE (OUTPATIENT)
Dept: ALLERGY | Facility: OTHER | Age: 70
End: 2020-07-23

## 2020-07-23 DIAGNOSIS — J30.2 PERENNIAL ALLERGIC RHINITIS WITH SEASONAL VARIATION: ICD-10-CM

## 2020-07-23 DIAGNOSIS — J30.89 PERENNIAL ALLERGIC RHINITIS WITH SEASONAL VARIATION: ICD-10-CM

## 2020-08-03 DIAGNOSIS — J30.2 PERENNIAL ALLERGIC RHINITIS WITH SEASONAL VARIATION: Primary | ICD-10-CM

## 2020-08-03 DIAGNOSIS — J30.89 PERENNIAL ALLERGIC RHINITIS WITH SEASONAL VARIATION: Primary | ICD-10-CM

## 2020-08-05 NOTE — TELEPHONE ENCOUNTER
Astelin       Last Written Prescription Date:  Patient Reported  Last Fill Quantity: 30mL,   # refills: 0  Last Office Visit: 6/4/2020  Future Office visit:       Routing refill request to provider for review/approval because:  Medication is reported/historical

## 2020-08-07 RX ORDER — AZELASTINE 1 MG/ML
SPRAY, METERED NASAL
Qty: 30 ML | Refills: 0 | Status: SHIPPED | OUTPATIENT
Start: 2020-08-07 | End: 2020-10-23

## 2020-09-02 ENCOUNTER — TELEPHONE (OUTPATIENT)
Dept: OTOLARYNGOLOGY | Facility: OTHER | Age: 70
End: 2020-09-02

## 2020-10-22 DIAGNOSIS — J30.2 PERENNIAL ALLERGIC RHINITIS WITH SEASONAL VARIATION: ICD-10-CM

## 2020-10-22 DIAGNOSIS — J30.89 PERENNIAL ALLERGIC RHINITIS WITH SEASONAL VARIATION: ICD-10-CM

## 2020-10-22 NOTE — TELEPHONE ENCOUNTER
ASTELIN      Last Written Prescription Date:  8-7-2020  Last Fill Quantity: 30ML,   # refills: 0  Last Office Visit: 11-  Future Office visit:       Routing refill request to provider for review/approval because:  Drug not on the [facility], [facility] or [facility] refill protocol or controlled substance

## 2020-10-23 RX ORDER — AZELASTINE 1 MG/ML
SPRAY, METERED NASAL
Qty: 30 ML | Refills: 0 | Status: SHIPPED | OUTPATIENT
Start: 2020-10-23 | End: 2021-01-08

## 2020-12-08 ENCOUNTER — TELEPHONE (OUTPATIENT)
Dept: ALLERGY | Facility: OTHER | Age: 70
End: 2020-12-08

## 2020-12-08 NOTE — TELEPHONE ENCOUNTER
Spoke with patient regarding refill on allergy drops.  Patient is not having issues on the drops, and his epi-pens are current. Patient is not due for a follow-up until March 2021.  Will process refill request.    Tanisha Ramirez RN

## 2021-01-06 DIAGNOSIS — J30.2 PERENNIAL ALLERGIC RHINITIS WITH SEASONAL VARIATION: ICD-10-CM

## 2021-01-06 DIAGNOSIS — J30.89 PERENNIAL ALLERGIC RHINITIS WITH SEASONAL VARIATION: ICD-10-CM

## 2021-01-07 NOTE — TELEPHONE ENCOUNTER
Ann  Last Written Prescription Date: 10/23/20  Last Fill Quantity: 30 ml# of Refills: 0  Last Office Visit: 6/4/20

## 2021-01-08 RX ORDER — AZELASTINE 1 MG/ML
SPRAY, METERED NASAL
Qty: 30 ML | Refills: 0 | Status: SHIPPED | OUTPATIENT
Start: 2021-01-08 | End: 2021-04-23

## 2021-03-08 ENCOUNTER — TELEPHONE (OUTPATIENT)
Dept: ALLERGY | Facility: OTHER | Age: 71
End: 2021-03-08

## 2021-03-08 DIAGNOSIS — T78.40XD ALLERGIC REACTION, SUBSEQUENT ENCOUNTER: ICD-10-CM

## 2021-03-08 RX ORDER — EPINEPHRINE 0.3 MG/.3ML
0.3 INJECTION SUBCUTANEOUS PRN
Qty: 0.6 ML | Refills: 1 | Status: SHIPPED | OUTPATIENT
Start: 2021-03-08 | End: 2023-05-17

## 2021-03-08 NOTE — TELEPHONE ENCOUNTER
Spoke with patient regarding refill on allergy drops.  Patient is not having issues on the drops.  His epi-pens have .  Patient requests a refill be sent to [facility], which will be done today.  Patient is due for a follow-up and wishes to wait until the end of April.  A follow-up is scheduled for 21.  Will process refill request.    Tanisha Ramirez RN

## 2021-04-23 DIAGNOSIS — J30.89 PERENNIAL ALLERGIC RHINITIS WITH SEASONAL VARIATION: ICD-10-CM

## 2021-04-23 DIAGNOSIS — J30.2 PERENNIAL ALLERGIC RHINITIS WITH SEASONAL VARIATION: ICD-10-CM

## 2021-04-23 RX ORDER — AZELASTINE 1 MG/ML
SPRAY, METERED NASAL
Qty: 30 ML | Refills: 0 | Status: SHIPPED | OUTPATIENT
Start: 2021-04-23 | End: 2021-04-27

## 2021-04-23 NOTE — TELEPHONE ENCOUNTER
Astelin nasal spray      Last Written Prescription Date:  1/8/21  Last Fill Quantity: 30 mL,   # refills: 0  Last Office Visit: 6/4/20  Future Office visit:    Next 5 appointments (look out 90 days)    Apr 27, 2021 11:00 AM  (Arrive by 10:45 AM)  Return Visit with Geovanna Thomas NP  [facility] [place] ([facility] ) [address]  [place] [address]  [phone]           Routing refill request to provider for review/approval because:

## 2021-04-27 ENCOUNTER — OFFICE VISIT (OUTPATIENT)
Dept: OTOLARYNGOLOGY | Facility: OTHER | Age: 71
End: 2021-04-27
Attending: NURSE PRACTITIONER
Payer: MEDICARE

## 2021-04-27 VITALS
BODY MASS INDEX: 22.19 KG/M2 | TEMPERATURE: 97.5 F | SYSTOLIC BLOOD PRESSURE: 118 MMHG | DIASTOLIC BLOOD PRESSURE: 72 MMHG | HEART RATE: 74 BPM | HEIGHT: 70 IN | OXYGEN SATURATION: 98 % | WEIGHT: 155 LBS

## 2021-04-27 DIAGNOSIS — J32.9 CHRONIC SINUSITIS, UNSPECIFIED LOCATION: ICD-10-CM

## 2021-04-27 DIAGNOSIS — J45.40 MODERATE PERSISTENT ASTHMA, UNSPECIFIED WHETHER COMPLICATED: ICD-10-CM

## 2021-04-27 DIAGNOSIS — A31.9: ICD-10-CM

## 2021-04-27 DIAGNOSIS — J33.9 NASAL POLYP: Primary | ICD-10-CM

## 2021-04-27 DIAGNOSIS — Z98.890 S/P FESS (FUNCTIONAL ENDOSCOPIC SINUS SURGERY): ICD-10-CM

## 2021-04-27 DIAGNOSIS — J30.89 PERENNIAL ALLERGIC RHINITIS WITH SEASONAL VARIATION: ICD-10-CM

## 2021-04-27 DIAGNOSIS — J30.2 PERENNIAL ALLERGIC RHINITIS WITH SEASONAL VARIATION: ICD-10-CM

## 2021-04-27 PROCEDURE — G0463 HOSPITAL OUTPT CLINIC VISIT: HCPCS | Mod: 25

## 2021-04-27 PROCEDURE — 31231 NASAL ENDOSCOPY DX: CPT | Performed by: NURSE PRACTITIONER

## 2021-04-27 PROCEDURE — 99213 OFFICE O/P EST LOW 20 MIN: CPT | Mod: 25 | Performed by: NURSE PRACTITIONER

## 2021-04-27 RX ORDER — AZELASTINE 1 MG/ML
2 SPRAY, METERED NASAL 2 TIMES DAILY
Qty: 30 ML | Refills: 0 | Status: SHIPPED | OUTPATIENT
Start: 2021-04-27 | End: 2021-06-29

## 2021-04-27 RX ORDER — BUDESONIDE 0.5 MG/2ML
INHALANT ORAL
Qty: 60 ML | Refills: 3 | Status: SHIPPED | OUTPATIENT
Start: 2021-04-27 | End: 2022-04-06

## 2021-04-27 ASSESSMENT — MIFFLIN-ST. JEOR: SCORE: 1454.33

## 2021-04-27 ASSESSMENT — PAIN SCALES - GENERAL: PAINLEVEL: NO PAIN (0)

## 2021-04-27 NOTE — PATIENT INSTRUCTIONS
Thank you for allowing Geovanna Thomas NP and our ENT team to participate in your care.  If your medications are too expensive, please give the nurse a call.  We can possibly change this medication.  If you have a scheduling or an appointment question please contact our Health Unit Coordinator at their direct line [phone] ext: [phone].   ALL nursing questions or concerns can be directed to your ENT Nurse: [phone]--Erinn    Continue Sublingual drops - 3 drops   Continue xyzal daily  Continue singulair daily  Start budesonide rinses as needed   Follow up in 1 year, sooner as needed with worsening of symptoms or new concerns

## 2021-04-27 NOTE — NURSING NOTE
"Chief Complaint   Patient presents with     Follow Up     SLIT; medication        Initial /72 (Cuff Size: Adult Regular)   Pulse 74   Temp 97.5  F (36.4  C) (Tympanic)   Ht 1.778 m (5' 10\")   Wt 70.3 kg (155 lb)   SpO2 98%   BMI 22.24 kg/m   Estimated body mass index is 22.24 kg/m  as calculated from the following:    Height as of this encounter: 1.778 m (5' 10\").    Weight as of this encounter: 70.3 kg (155 lb).  Medication Reconciliation: complete  Maribell Parish LPN    "

## 2021-04-27 NOTE — PROGRESS NOTES
Chief Complaint   Patient presents with     Follow Up     SLIT; medication        Maco Benavidez presents for a follow up for SLIT    Has been doing well with drops.  Denies oral itching/swelling  Has noticed improvement with symptoms most of the year, but has increased symptoms of nasal drainage in spring as he did last spring.  He does not have a current prescription for budesonide, has not been doing rinses.  He did notice improvement with budesonide rinses last spring.    Has been using Xyzal, singulair, astelin and flonase for symptomatic relief   He is approximately 3 years into SLIT at this time.     No eric sinusitis, but feels he has been mouth breathing more frequently recently.    No recent illnesses or sinus infections    Epi pen is up to date    In the past 4 weeks, home much of the time did your asthma keep you from getting as much done at work, school or at home? 4 A little of the time  During the past 4 weeks, how often have you had shortness of breath? 5 Not at all  During the past 4 weeks, how often did your asthma symptoms (wheezing, coughing, shortness of breath, chest tightness or pain) wake you up at night or earlier than usual in the morning? 5 Not at all  During the past 4 weeks, how often have you used your rescue inhaler or nebulizer medication (such as albuterol)? 5 Not at all  How would you rate your asthma control during the past 4 weeks? 4 Well controlled  Total Score: 21    He continues to work with infectious diseases for mycobacterium, continues treatment.     MQT completed 5/11/18:  Dilution #6- Ragweed, grass, birch, cat, dust.   Dilution #5- maple, oak, anthony, cottonwood, walnut.   Dilution #2- pigweed, thistle, elm, pine, molds, dog    Allergies   Allergen Reactions     Aspirin      Food      Apples, nectarines......throat     Other [Seasonal Allergies]      Inhalant in type environmental     Current Outpatient Medications   Medication     albuterol (ALBUTEROL) 108 (90 BASE)  "MCG/ACT inhaler     azelastine (ASTELIN) 0.1 % nasal spray     azithromycin (ZITHROMAX) 500 MG tablet     budesonide (PULMICORT) 0.5 MG/2ML neb solution     cholecalciferol (VITAMIN D3) 1000 UNIT tablet     EPINEPHrine (ANY BX GENERIC EQUIV) 0.3 MG/0.3ML injection 2-pack     ethambutol (MYAMBUTOL) 400 MG tablet     fish oil-omega-3 fatty acids (FISH OIL) 1000 MG capsule     Flaxseed, Linseed, (FLAXSEED OIL) 1000 MG CAPS     fluocinonide (LIDEX) 0.05 % cream     fluticasone (FLONASE) 50 MCG/ACT nasal spray     fluticasone (FLOVENT HFA) 220 MCG/ACT inhaler     levocetirizine (XYZAL) 5 MG tablet     Lutein 6 MG CAPS     montelukast (SINGULAIR) 10 MG tablet     Multiple Vitamin (DAILY MULTIVITAMIN PO)     rifampin (RIFADIN) 300 MG capsule     SUBLINGUAL IMMUNOTHERAPY, SLIT,     warfarin (COUMADIN) 5 MG tablet     No current facility-administered medications for this visit.      Past Medical History:   Diagnosis Date     Clotting disorder (H)     Factor V Leiden, history of DVT and PE     Uncomplicated asthma      Past Surgical History:   Procedure Laterality Date     COLONOSCOPY N/A 5/4/2018    Procedure: COLONOSCOPY;  COLONOSCOPY;  Surgeon: Jc Reardon MD;  Location: [facility]     COLONOSCOPY - HIM SCAN  09/13/2006    Colonoscopy, Flex, Diagnostic.  Repeat 2016;  No polyps identified.  Moderate sized hemorrhoids, No diverticular disease.  He can go ten years before his next colonoscopy     FLEXIBLE SIGMOIDOSCOPY - HIM SCAN  09/05/1996    Sigmoidoscopy, biopsy, flex sig, with biopsy;  within normal limits     HERNIA REPAIR       Nasal Polypectomy x3       Social History     Tobacco Use     Smoking status: Never Smoker     Smokeless tobacco: Never Used   Substance Use Topics     Alcohol use: Yes     Comment: beer occasionally     Drug use: No       /72 (Cuff Size: Adult Regular)   Pulse 74   Temp 97.5  F (36.4  C) (Tympanic)   Ht 1.778 m (5' 10\")   Wt 70.3 kg (155 lb)   SpO2 98%   BMI 22.24 kg/m  "     General - The patient is well nourished and well developed.  Alert and oriented to person and place, answers questions and cooperates with examination appropriately.   Head and Face - Normocephalic and atraumatic, with no gross asymmetry noted.  The facial nerve is intact, with strong symmetric movements.  Voice and Breathing - The patient was breathing comfortably without the use of accessory muscles. There was no wheezing, stridor, or stertor.  The patients voice was clear and strong, and had appropriate pitch and quality.  Ears - External ears are normal in appearance. The external auditory canals are patent, the tympanic membranes are intact without effusion, retraction or mass.  Bony landmarks are intact.  Eyes - Extraocular movements intact.  Conjunctiva were not injected.  Mouth - Examination of the oral cavity showed pink, healthy oral mucosa. No lesions or ulcerations noted.  The tongue was mobile and midline, and the dentition were in good condition.    Throat - The walls of the oropharynx were smooth, pink, moist, symmetric, and had no lesions or ulcerations.  The tonsillar pillars and soft palate were symmetric.  The uvula was midline on elevation.    Neck -  Palpation of the occipital, submental, submandibular, internal jugular chain, and supraclavicular nodes did not demonstrate any abnormal lymph nodes or masses.  Palpation of the thyroid was soft and smooth, with no nodules or goiter appreciated.  The trachea was mobile and midline.  Nose - External contour is symmetric, no gross deflection or scars.  Nasal mucosa is pink and moist with no abnormal mucus.  The septum was intact, turbinates of normal size and position.  No polyps, masses, or purulence noted on examination.    To evaluate the nose and sinuses, I performed rigid nasal endoscopy.  I sprayed both nares with 2 sprays lidocaine and neosynephrine.     I began with the RIGHT side using a 0 degree rigid nasal endoscope, and then similarly  examined the LEFT side     Findings: septum is grossly midline and intact  Inferior turbinates:  lateralized and reduced  Middle turbinate and middle meatus:  Mild polypoid change on the face of the bilateral MT's right > left.   No purulence, no polyposis  The right max antrostomy is open, no polypoid change noted, the left max antrostomy is open with minimal polypoid change.  The ethmoid cavities are open bilaterally with very minimal polypoid change.  Nasopharynx clear, ET patent, no edema  Overall appears stable compared to documentation of exam completed by Kathy HAMMER on 11/22/19  The patient tolerated the procedure well      ASSESSMENT:      ICD-10-CM    1. Nasal polyp  J33.9 budesonide (PULMICORT) 0.5 MG/2ML neb solution   2. Perennial allergic rhinitis with seasonal variation  J30.89 budesonide (PULMICORT) 0.5 MG/2ML neb solution    J30.2 azelastine (ASTELIN) 0.1 % nasal spray   3. Chronic sinusitis, unspecified location  J32.9 budesonide (PULMICORT) 0.5 MG/2ML neb solution   4. S/P distant FESS (functional endoscopic sinus surgery)  Z98.890    5. 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

## 2021-04-27 NOTE — LETTER
4/27/2021         RE: Maco Benavidez  [address]  [place] [address]        Dear Colleague,    Thank you for referring your patient, Maco Benavidez, to the [facility]. Please see a copy of my visit note below.    Chief Complaint   Patient presents with     Follow Up     SLIT; medication        Maco Benavidez presents for a follow up for SLIT    Has been doing well with drops.  Denies oral itching/swelling  Has noticed improvement with symptoms most of the year, but has increased symptoms of nasal drainage in spring as he did last spring.  He does not have a current prescription for budesonide, has not been doing rinses.  He did notice improvement with budesonide rinses last spring.    Has been using Xyzal, singulair, astelin and flonase for symptomatic relief   He is approximately 3 years into SLIT at this time.     No eric sinusitis, but feels he has been mouth breathing more frequently recently.    No recent illnesses or sinus infections    Epi pen is up to date    In the past 4 weeks, home much of the time did your asthma keep you from getting as much done at work, school or at home? 4 A little of the time  During the past 4 weeks, how often have you had shortness of breath? 5 Not at all  During the past 4 weeks, how often did your asthma symptoms (wheezing, coughing, shortness of breath, chest tightness or pain) wake you up at night or earlier than usual in the morning? 5 Not at all  During the past 4 weeks, how often have you used your rescue inhaler or nebulizer medication (such as albuterol)? 5 Not at all  How would you rate your asthma control during the past 4 weeks? 4 Well controlled  Total Score: 21    He continues to work with infectious diseases for mycobacterium, continues treatment.     MQT completed 5/11/18:  Dilution #6- Ragweed, grass, birch, cat, dust.   Dilution #5- maple, oak, anthony, cottonwood, walnut.   Dilution #2- pigweed, thistle, elm, pine, molds,  dog    Allergies   Allergen Reactions     Aspirin      Food      Apples, nectarines......throat     Other [Seasonal Allergies]      Inhalant in type environmental     Current Outpatient Medications   Medication     albuterol (ALBUTEROL) 108 (90 BASE) MCG/ACT inhaler     azelastine (ASTELIN) 0.1 % nasal spray     azithromycin (ZITHROMAX) 500 MG tablet     budesonide (PULMICORT) 0.5 MG/2ML neb solution     cholecalciferol (VITAMIN D3) 1000 UNIT tablet     EPINEPHrine (ANY BX GENERIC EQUIV) 0.3 MG/0.3ML injection 2-pack     ethambutol (MYAMBUTOL) 400 MG tablet     fish oil-omega-3 fatty acids (FISH OIL) 1000 MG capsule     Flaxseed, Linseed, (FLAXSEED OIL) 1000 MG CAPS     fluocinonide (LIDEX) 0.05 % cream     fluticasone (FLONASE) 50 MCG/ACT nasal spray     fluticasone (FLOVENT HFA) 220 MCG/ACT inhaler     levocetirizine (XYZAL) 5 MG tablet     Lutein 6 MG CAPS     montelukast (SINGULAIR) 10 MG tablet     Multiple Vitamin (DAILY MULTIVITAMIN PO)     rifampin (RIFADIN) 300 MG capsule     SUBLINGUAL IMMUNOTHERAPY, SLIT,     warfarin (COUMADIN) 5 MG tablet     No current facility-administered medications for this visit.      Past Medical History:   Diagnosis Date     Clotting disorder (H)     Factor V Leiden, history of DVT and PE     Uncomplicated asthma      Past Surgical History:   Procedure Laterality Date     COLONOSCOPY N/A 5/4/2018    Procedure: COLONOSCOPY;  COLONOSCOPY;  Surgeon: Jc Reardon MD;  Location: [facility]     COLONOSCOPY - [facility] SCAN  09/13/2006    Colonoscopy, Flex, Diagnostic.  Repeat 2016;  No polyps identified.  Moderate sized hemorrhoids, No diverticular disease.  He can go ten years before his next colonoscopy     FLEXIBLE SIGMOIDOSCOPY - [facility] SCAN  09/05/1996    Sigmoidoscopy, biopsy, flex sig, with biopsy;  within normal limits     HERNIA REPAIR       Nasal Polypectomy x3       Social History     Tobacco Use     Smoking status: Never Smoker     Smokeless tobacco: Never Used   Substance Use  "Topics     Alcohol use: Yes     Comment: beer occasionally     Drug use: No       /72 (Cuff Size: Adult Regular)   Pulse 74   Temp 97.5  F (36.4  C) (Tympanic)   Ht 1.778 m (5' 10\")   Wt 70.3 kg (155 lb)   SpO2 98%   BMI 22.24 kg/m      General - The patient is well nourished and well developed.  Alert and oriented to person and place, answers questions and cooperates with examination appropriately.   Head and Face - Normocephalic and atraumatic, with no gross asymmetry noted.  The facial nerve is intact, with strong symmetric movements.  Voice and Breathing - The patient was breathing comfortably without the use of accessory muscles. There was no wheezing, stridor, or stertor.  The patients voice was clear and strong, and had appropriate pitch and quality.  Ears - External ears are normal in appearance. The external auditory canals are patent, the tympanic membranes are intact without effusion, retraction or mass.  Bony landmarks are intact.  Eyes - Extraocular movements intact.  Conjunctiva were not injected.  Mouth - Examination of the oral cavity showed pink, healthy oral mucosa. No lesions or ulcerations noted.  The tongue was mobile and midline, and the dentition were in good condition.    Throat - The walls of the oropharynx were smooth, pink, moist, symmetric, and had no lesions or ulcerations.  The tonsillar pillars and soft palate were symmetric.  The uvula was midline on elevation.    Neck -  Palpation of the occipital, submental, submandibular, internal jugular chain, and supraclavicular nodes did not demonstrate any abnormal lymph nodes or masses.  Palpation of the thyroid was soft and smooth, with no nodules or goiter appreciated.  The trachea was mobile and midline.  Nose - External contour is symmetric, no gross deflection or scars.  Nasal mucosa is pink and moist with no abnormal mucus.  The septum was intact, turbinates of normal size and position.  No polyps, masses, or purulence noted " on examination.    To evaluate the nose and sinuses, I performed rigid nasal endoscopy.  I sprayed both nares with 2 sprays lidocaine and neosynephrine.     I began with the RIGHT side using a 0 degree rigid nasal endoscope, and then similarly examined the LEFT side     Findings: septum is grossly midline and intact  Inferior turbinates:  lateralized and reduced  Middle turbinate and middle meatus:  Mild polypoid change on the face of the bilateral MT's right > left.   No purulence, no polyposis  The right max antrostomy is open, no polypoid change noted, the left max antrostomy is open with minimal polypoid change.  The ethmoid cavities are open bilaterally with very minimal polypoid change.  Nasopharynx clear, ET patent, no edema  Overall appears stable compared to documentation of exam completed by Kathy HAMMER on 11/22/19  The patient tolerated the procedure well      ASSESSMENT:      ICD-10-CM    1. Nasal polyp  J33.9 budesonide (PULMICORT) 0.5 MG/2ML neb solution   2. Perennial allergic rhinitis with seasonal variation  J30.89 budesonide (PULMICORT) 0.5 MG/2ML neb solution    J30.2 azelastine (ASTELIN) 0.1 % nasal spray   3. Chronic sinusitis, unspecified location  J32.9 budesonide (PULMICORT) 0.5 MG/2ML neb solution   4. S/P distant FESS (functional endoscopic sinus surgery)  Z98.890    5. 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        Sincerely,        Geovanna Thomas, NP

## 2021-05-22 ENCOUNTER — HEALTH MAINTENANCE LETTER (OUTPATIENT)
Age: 71
End: 2021-05-22

## 2021-06-04 DIAGNOSIS — J30.2 PERENNIAL ALLERGIC RHINITIS WITH SEASONAL VARIATION: ICD-10-CM

## 2021-06-04 DIAGNOSIS — J30.2 SEASONAL ALLERGIC RHINITIS, UNSPECIFIED TRIGGER: ICD-10-CM

## 2021-06-04 DIAGNOSIS — J30.89 PERENNIAL ALLERGIC RHINITIS WITH SEASONAL VARIATION: ICD-10-CM

## 2021-06-04 RX ORDER — LEVOCETIRIZINE DIHYDROCHLORIDE 5 MG/1
TABLET, FILM COATED ORAL
Qty: 90 TABLET | Refills: 0 | Status: SHIPPED | OUTPATIENT
Start: 2021-06-04 | End: 2021-09-07

## 2021-06-09 DIAGNOSIS — J30.89 PERENNIAL ALLERGIC RHINITIS WITH SEASONAL VARIATION: ICD-10-CM

## 2021-06-09 DIAGNOSIS — J30.2 PERENNIAL ALLERGIC RHINITIS WITH SEASONAL VARIATION: ICD-10-CM

## 2021-06-09 NOTE — TELEPHONE ENCOUNTER
Spoke with patient regarding refill on allergy drops.  Patient is not having issues on the drops, and his epi-pens are current. Patient is not due for a follow-up at this time.  Will process refill request.    A new order is needed as the current order will outdate in July.  Order will be routed to Geovanna Thomas CNP, to sign.    Tanisha Ramirez RN

## 2021-06-29 DIAGNOSIS — J33.9 NASAL POLYP: ICD-10-CM

## 2021-06-29 DIAGNOSIS — J30.2 PERENNIAL ALLERGIC RHINITIS WITH SEASONAL VARIATION: ICD-10-CM

## 2021-06-29 DIAGNOSIS — J30.89 PERENNIAL ALLERGIC RHINITIS WITH SEASONAL VARIATION: ICD-10-CM

## 2021-06-29 DIAGNOSIS — J31.0 CHRONIC RHINITIS: ICD-10-CM

## 2021-06-29 RX ORDER — AZELASTINE 1 MG/ML
SPRAY, METERED NASAL
Qty: 30 ML | Refills: 0 | Status: SHIPPED | OUTPATIENT
Start: 2021-06-29 | End: 2021-09-29

## 2021-06-29 RX ORDER — FLUTICASONE PROPIONATE 50 MCG
SPRAY, SUSPENSION (ML) NASAL
Qty: 16 G | Refills: 0 | Status: SHIPPED | OUTPATIENT
Start: 2021-06-29 | End: 2021-08-10

## 2021-06-29 NOTE — TELEPHONE ENCOUNTER
fluticasone (FLONASE) 50 MCG/ACT nasal spray      Last Written Prescription Date:  4/24/2020  Last Fill Quantity: 16 g,   # refills: 0  Last Office Visit: 4/27/2021  Future Office visit:

## 2021-06-29 NOTE — TELEPHONE ENCOUNTER
azelastine (ASTELIN) 0.1 % nasal spray      Last Written Prescription Date:  4/24/2021  Last Fill Quantity: 30  ml,   # refills: 0  Last Office Visit: 4/27/2021  Future Office visit:            Statement Selected

## 2021-08-09 DIAGNOSIS — J31.0 CHRONIC RHINITIS: ICD-10-CM

## 2021-08-09 DIAGNOSIS — J33.9 NASAL POLYP: ICD-10-CM

## 2021-08-09 DIAGNOSIS — J30.2 PERENNIAL ALLERGIC RHINITIS WITH SEASONAL VARIATION: ICD-10-CM

## 2021-08-09 DIAGNOSIS — J30.89 PERENNIAL ALLERGIC RHINITIS WITH SEASONAL VARIATION: ICD-10-CM

## 2021-08-10 RX ORDER — FLUTICASONE PROPIONATE 50 MCG
SPRAY, SUSPENSION (ML) NASAL
Qty: 16 G | Refills: 0 | Status: SHIPPED | OUTPATIENT
Start: 2021-08-10 | End: 2021-09-14

## 2021-08-10 NOTE — TELEPHONE ENCOUNTER
FLONASE      Last Written Prescription Date:  6-  Last Fill Quantity: 16G,   # refills: 0  Last Office Visit: 4-  Future Office visit:       Routing refill request to provider for review/approval because:

## 2021-09-03 DIAGNOSIS — J30.89 PERENNIAL ALLERGIC RHINITIS WITH SEASONAL VARIATION: ICD-10-CM

## 2021-09-03 DIAGNOSIS — J30.2 PERENNIAL ALLERGIC RHINITIS WITH SEASONAL VARIATION: ICD-10-CM

## 2021-09-03 DIAGNOSIS — J30.2 SEASONAL ALLERGIC RHINITIS, UNSPECIFIED TRIGGER: ICD-10-CM

## 2021-09-07 RX ORDER — LEVOCETIRIZINE DIHYDROCHLORIDE 5 MG/1
TABLET, FILM COATED ORAL
Qty: 90 TABLET | Refills: 0 | Status: SHIPPED | OUTPATIENT
Start: 2021-09-07 | End: 2021-12-07

## 2021-09-07 NOTE — TELEPHONE ENCOUNTER
Xyzal      Last Written Prescription Date:  6.4.21  Last Fill Quantity: #90,   # refills: 0  Last Office Visit: 4.27.21  Future Office visit:       Routing refill request to provider for review/approval because:

## 2021-09-10 ENCOUNTER — TELEPHONE (OUTPATIENT)
Dept: ALLERGY | Facility: OTHER | Age: 71
End: 2021-09-10

## 2021-09-10 NOTE — TELEPHONE ENCOUNTER
Patient called requesting a refill of his allergy drops.  He said he was doing well on the drops and denies any adverse reactions.  He said his Epi Pen is current.  His next f/u isn't due until 04/22.  We will process this refill request.    Harris Garcia RN on 9/10/2021 at 2:58 PM

## 2021-09-11 ENCOUNTER — HEALTH MAINTENANCE LETTER (OUTPATIENT)
Age: 71
End: 2021-09-11

## 2021-09-13 DIAGNOSIS — J30.2 PERENNIAL ALLERGIC RHINITIS WITH SEASONAL VARIATION: ICD-10-CM

## 2021-09-13 DIAGNOSIS — J31.0 CHRONIC RHINITIS: ICD-10-CM

## 2021-09-13 DIAGNOSIS — J33.9 NASAL POLYP: ICD-10-CM

## 2021-09-13 DIAGNOSIS — J30.89 PERENNIAL ALLERGIC RHINITIS WITH SEASONAL VARIATION: ICD-10-CM

## 2021-09-14 RX ORDER — FLUTICASONE PROPIONATE 50 MCG
SPRAY, SUSPENSION (ML) NASAL
Qty: 16 G | Refills: 0 | Status: SHIPPED | OUTPATIENT
Start: 2021-09-14 | End: 2021-10-26

## 2021-09-14 NOTE — TELEPHONE ENCOUNTER
isauranase      Last Written Prescription Date:  8/10/21  Last Fill Quantity: 16 g,   # refills: 0  Last Office Visit: 4/27/21  Future Office visit:

## 2021-09-28 DIAGNOSIS — J30.89 PERENNIAL ALLERGIC RHINITIS WITH SEASONAL VARIATION: ICD-10-CM

## 2021-09-28 DIAGNOSIS — J30.2 PERENNIAL ALLERGIC RHINITIS WITH SEASONAL VARIATION: ICD-10-CM

## 2021-09-28 NOTE — TELEPHONE ENCOUNTER
Ann  Last Written Prescription Date: 6/29/21  Last Fill Quantity: 30 ml # of Refills: 0  Last Office Visit: 4/27/21

## 2021-09-29 RX ORDER — AZELASTINE 1 MG/ML
SPRAY, METERED NASAL
Qty: 30 ML | Refills: 0 | Status: SHIPPED | OUTPATIENT
Start: 2021-09-29 | End: 2022-01-17

## 2021-10-20 DIAGNOSIS — J30.89 PERENNIAL ALLERGIC RHINITIS WITH SEASONAL VARIATION: ICD-10-CM

## 2021-10-20 DIAGNOSIS — J33.9 NASAL POLYP: ICD-10-CM

## 2021-10-20 DIAGNOSIS — J30.2 PERENNIAL ALLERGIC RHINITIS WITH SEASONAL VARIATION: ICD-10-CM

## 2021-10-20 DIAGNOSIS — J31.0 CHRONIC RHINITIS: ICD-10-CM

## 2021-10-20 NOTE — TELEPHONE ENCOUNTER
flonase  Last Written Prescription Date: 9/14/21  Last Fill Quantity: 16 g # of Refills: 0  Last Office Visit: 4/27/21

## 2021-10-26 RX ORDER — FLUTICASONE PROPIONATE 50 MCG
SPRAY, SUSPENSION (ML) NASAL
Qty: 16 G | Refills: 0 | Status: SHIPPED | OUTPATIENT
Start: 2021-10-26 | End: 2021-12-17

## 2021-12-06 DIAGNOSIS — J30.2 PERENNIAL ALLERGIC RHINITIS WITH SEASONAL VARIATION: ICD-10-CM

## 2021-12-06 DIAGNOSIS — J30.2 SEASONAL ALLERGIC RHINITIS, UNSPECIFIED TRIGGER: ICD-10-CM

## 2021-12-06 DIAGNOSIS — J30.89 PERENNIAL ALLERGIC RHINITIS WITH SEASONAL VARIATION: ICD-10-CM

## 2021-12-07 RX ORDER — LEVOCETIRIZINE DIHYDROCHLORIDE 5 MG/1
TABLET, FILM COATED ORAL
Qty: 90 TABLET | Refills: 0 | Status: SHIPPED | OUTPATIENT
Start: 2021-12-07 | End: 2022-03-11

## 2021-12-07 NOTE — TELEPHONE ENCOUNTER
Xyzal  Last Written Prescription Date: 9/7/21  Last Fill Quantity: 90 # of Refills: 0  Last Office Visit: 4/27/21

## 2021-12-16 DIAGNOSIS — J30.89 PERENNIAL ALLERGIC RHINITIS WITH SEASONAL VARIATION: ICD-10-CM

## 2021-12-16 DIAGNOSIS — J30.2 PERENNIAL ALLERGIC RHINITIS WITH SEASONAL VARIATION: ICD-10-CM

## 2021-12-16 DIAGNOSIS — J31.0 CHRONIC RHINITIS: ICD-10-CM

## 2021-12-16 DIAGNOSIS — J33.9 NASAL POLYP: ICD-10-CM

## 2021-12-17 RX ORDER — FLUTICASONE PROPIONATE 50 MCG
SPRAY, SUSPENSION (ML) NASAL
Qty: 16 G | Refills: 0 | Status: SHIPPED | OUTPATIENT
Start: 2021-12-17 | End: 2022-01-28

## 2021-12-17 NOTE — TELEPHONE ENCOUNTER
FLONASE      Last Written Prescription Date:  10-  Last Fill Quantity: 1,   # refills: 0  Last Office Visit: 4-  Future Office visit:       Routing refill request to provider for review/approval because:  Drug not on the [facility], [facility] or [facility] refill protocol or controlled substance

## 2022-01-17 DIAGNOSIS — J30.89 PERENNIAL ALLERGIC RHINITIS WITH SEASONAL VARIATION: ICD-10-CM

## 2022-01-17 DIAGNOSIS — J30.2 PERENNIAL ALLERGIC RHINITIS WITH SEASONAL VARIATION: ICD-10-CM

## 2022-01-17 RX ORDER — AZELASTINE 1 MG/ML
SPRAY, METERED NASAL
Qty: 30 ML | Refills: 0 | Status: SHIPPED | OUTPATIENT
Start: 2022-01-17 | End: 2022-05-25

## 2022-01-17 NOTE — TELEPHONE ENCOUNTER
Ann  Last Written Prescription Date: 9/29/21  Last Fill Quantity: 30 ml # of Refills: 0  Last Office Visit: 4/27/21

## 2022-01-26 DIAGNOSIS — J31.0 CHRONIC RHINITIS: ICD-10-CM

## 2022-01-26 DIAGNOSIS — J30.89 PERENNIAL ALLERGIC RHINITIS WITH SEASONAL VARIATION: ICD-10-CM

## 2022-01-26 DIAGNOSIS — J30.2 PERENNIAL ALLERGIC RHINITIS WITH SEASONAL VARIATION: ICD-10-CM

## 2022-01-26 DIAGNOSIS — J33.9 NASAL POLYP: ICD-10-CM

## 2022-01-27 NOTE — TELEPHONE ENCOUNTER
Flonase      Last Written Prescription Date:  12/17/21  Last Fill Quantity: 16 g,   # refills: 0  Last Office Visit: 4/27/21  Future Office visit:       Routing refill request to provider for review/approval because:

## 2022-01-28 RX ORDER — FLUTICASONE PROPIONATE 50 MCG
SPRAY, SUSPENSION (ML) NASAL
Qty: 16 G | Refills: 0 | Status: SHIPPED | OUTPATIENT
Start: 2022-01-28 | End: 2022-03-11

## 2022-03-11 DIAGNOSIS — J30.2 PERENNIAL ALLERGIC RHINITIS WITH SEASONAL VARIATION: ICD-10-CM

## 2022-03-11 DIAGNOSIS — J33.9 NASAL POLYP: ICD-10-CM

## 2022-03-11 DIAGNOSIS — J31.0 CHRONIC RHINITIS: ICD-10-CM

## 2022-03-11 DIAGNOSIS — J30.89 PERENNIAL ALLERGIC RHINITIS WITH SEASONAL VARIATION: ICD-10-CM

## 2022-03-11 DIAGNOSIS — J30.2 SEASONAL ALLERGIC RHINITIS, UNSPECIFIED TRIGGER: ICD-10-CM

## 2022-03-11 RX ORDER — LEVOCETIRIZINE DIHYDROCHLORIDE 5 MG/1
TABLET, FILM COATED ORAL
Qty: 90 TABLET | Refills: 3 | Status: SHIPPED | OUTPATIENT
Start: 2022-03-11 | End: 2023-03-28

## 2022-03-11 RX ORDER — FLUTICASONE PROPIONATE 50 MCG
SPRAY, SUSPENSION (ML) NASAL
Qty: 16 G | Refills: 11 | Status: SHIPPED | OUTPATIENT
Start: 2022-03-11 | End: 2023-05-17

## 2022-03-11 NOTE — TELEPHONE ENCOUNTER
fluticasone (FLONASE) 50 MCG/ACT nasal spray      Last Written Prescription Date:  1-28-22  Last Fill Quantity: 16g,   # refills: 0  Last Office Visit: 4-27-21  Future Office visit:

## 2022-03-11 NOTE — TELEPHONE ENCOUNTER
levocetirizine (XYZAL) 5 MG tablet      Last Written Prescription Date:  12-7-21  Last Fill Quantity: 90,   # refills: 0  Last Office Visit: 4-27-21  Future Office visit:       Routing refill request to provider for review/approval because:   Patient is 3-64 years of age

## 2022-03-18 ENCOUNTER — TELEPHONE (OUTPATIENT)
Dept: ALLERGY | Facility: OTHER | Age: 72
End: 2022-03-18
Payer: COMMERCIAL

## 2022-03-18 DIAGNOSIS — J30.2 SEASONAL ALLERGIC RHINITIS, UNSPECIFIED TRIGGER: Primary | ICD-10-CM

## 2022-03-18 RX ORDER — EPINEPHRINE 0.3 MG/.3ML
0.3 INJECTION SUBCUTANEOUS DAILY PRN
Qty: 0.6 ML | Refills: 1 | Status: SHIPPED | OUTPATIENT
Start: 2022-03-18 | End: 2022-04-06

## 2022-03-18 NOTE — TELEPHONE ENCOUNTER
Received a refill request from [facility] requesting a refill of this patient's allergy drops.  Called patient.  He denied any adverse reactions and is tolerating the drops well.  He needs a new Epi-Pen, rx will be sent to [facility].    He is due for a SLIT f/u, will have scheduling contact him to set this up. We will process this refill request.       Harris Garcia RN on 3/18/2022 at 3:54 PM

## 2022-03-30 PROBLEM — A31.0 MYCOBACTERIUM AVIUM-INTRACELLULARE INFECTION (H): Status: ACTIVE | Noted: 2020-04-14

## 2022-03-30 PROBLEM — L20.84 INTRINSIC ECZEMA: Status: ACTIVE | Noted: 2020-04-14

## 2022-04-06 ENCOUNTER — OFFICE VISIT (OUTPATIENT)
Dept: OTOLARYNGOLOGY | Facility: OTHER | Age: 72
End: 2022-04-06
Attending: NURSE PRACTITIONER
Payer: COMMERCIAL

## 2022-04-06 VITALS
WEIGHT: 157 LBS | HEIGHT: 70 IN | SYSTOLIC BLOOD PRESSURE: 122 MMHG | OXYGEN SATURATION: 98 % | DIASTOLIC BLOOD PRESSURE: 74 MMHG | HEART RATE: 82 BPM | TEMPERATURE: 97.5 F | BODY MASS INDEX: 22.48 KG/M2

## 2022-04-06 DIAGNOSIS — J30.2 SEASONAL ALLERGIC RHINITIS, UNSPECIFIED TRIGGER: Primary | ICD-10-CM

## 2022-04-06 PROCEDURE — 99213 OFFICE O/P EST LOW 20 MIN: CPT | Performed by: NURSE PRACTITIONER

## 2022-04-06 PROCEDURE — G0463 HOSPITAL OUTPT CLINIC VISIT: HCPCS

## 2022-04-06 ASSESSMENT — PAIN SCALES - GENERAL: PAINLEVEL: NO PAIN (0)

## 2022-04-06 NOTE — LETTER
4/6/2022         RE: Maco Benavidez  [address]  [place] [address]        Dear Colleague,    Thank you for referring your patient, Maco Benavidez, to the [facility]. Please see a copy of my visit note below.    Chief Complaint   Patient presents with     Follow Up     SLIT        Maco Benavidez presents for a SLIT follow up    Has been doing well with drops.  Denies oral itching/swelling  He has been using plain hortensia med sinus rinses about 1 time per day.      He is ending the antibiotics in 6-7 weeks.   Still following with Pulmonology and infectious diseases.     Has noticed improvement with symptoms  Does see relief with hortensia med sinus rinses.    No eric sinusitis  No recent illnesses or sinus infections    Epi pen is up to date    Allergies   Allergen Reactions     Aspirin      Food      Apples, nectarines......throat     Other [Seasonal Allergies]      Inhalant in type environmental     Current Outpatient Medications   Medication     albuterol (ALBUTEROL) 108 (90 BASE) MCG/ACT inhaler     azelastine (ASTELIN) 0.1 % nasal spray     azithromycin (ZITHROMAX) 500 MG tablet     cholecalciferol (VITAMIN D3) 1000 UNIT tablet     EPINEPHrine (ANY BX GENERIC EQUIV) 0.3 MG/0.3ML injection 2-pack     ethambutol (MYAMBUTOL) 400 MG tablet     fish oil-omega-3 fatty acids (FISH OIL) 1000 MG capsule     Flaxseed, Linseed, (FLAXSEED OIL) 1000 MG CAPS     fluocinonide (LIDEX) 0.05 % cream     fluticasone (FLONASE) 50 MCG/ACT nasal spray     fluticasone (FLOVENT HFA) 220 MCG/ACT inhaler     levocetirizine (XYZAL) 5 MG tablet     Lutein 6 MG CAPS     montelukast (SINGULAIR) 10 MG tablet     Multiple Vitamin (DAILY MULTIVITAMIN PO)     rifampin (RIFADIN) 300 MG capsule     SUBLINGUAL IMMUNOTHERAPY, SLIT,     warfarin (COUMADIN) 5 MG tablet     No current facility-administered medications for this visit.     Past Medical History:   Diagnosis Date     Clotting disorder (H)     Factor V Leiden, history  "of DVT and PE     Uncomplicated asthma      Past Surgical History:   Procedure Laterality Date     COLONOSCOPY N/A 5/4/2018    Procedure: COLONOSCOPY;  COLONOSCOPY;  Surgeon: Jc Reardon MD;  Location: [facility]     COLONOSCOPY - [facility] SCAN  09/13/2006    Colonoscopy, Flex, Diagnostic.  Repeat 2016;  No polyps identified.  Moderate sized hemorrhoids, No diverticular disease.  He can go ten years before his next colonoscopy     FLEXIBLE SIGMOIDOSCOPY - [facility] SCAN  09/05/1996    Sigmoidoscopy, biopsy, flex sig, with biopsy;  within normal limits     HERNIA REPAIR       Nasal Polypectomy x3       Social History     Tobacco Use     Smoking status: Never Smoker     Smokeless tobacco: Never Used   Substance Use Topics     Alcohol use: Yes     Comment: beer occasionally     Drug use: No       /74 (Cuff Size: Adult Regular)   Pulse 82   Temp 97.5  F (36.4  C) (Tympanic)   Ht 1.778 m (5' 10\")   Wt 71.2 kg (157 lb)   SpO2 98%   BMI 22.53 kg/m    General - The patient is well nourished and well developed.  Alert and oriented to person and place, answers questions and cooperates with examination appropriately.   Head and Face - Normocephalic and atraumatic, with no gross asymmetry noted.  The facial nerve is intact, with strong symmetric movements.  Voice and Breathing - The patient was breathing comfortably without the use of accessory muscles. There was no wheezing, stridor, or stertor.  The patients voice was clear and strong, and had appropriate pitch and quality.  Ears - External ears are normal in appearance. The external auditory canals are patent, the tympanic membranes are intact without effusion, retraction or mass.  Bony landmarks are intact.  Eyes - Extraocular movements intact.  Conjunctiva were not injected.  Mouth - Examination of the oral cavity showed pink, healthy oral mucosa. No lesions or ulcerations noted.  The tongue was mobile and midline, and the dentition were in good condition.    Throat - " The walls of the oropharynx were smooth, pink, moist, symmetric, and had no lesions or ulcerations.  The tonsillar pillars and soft palate were symmetric.  The uvula was midline on elevation.    Neck -  Palpation of the occipital, submental, submandibular, internal jugular chain, and supraclavicular nodes did not demonstrate any abnormal lymph nodes or masses.  Palpation of the thyroid was soft and smooth, with no nodules or goiter appreciated.  The trachea was mobile and midline.  Nose - External contour is symmetric, no gross deflection or scars.  Nasal mucosa is pink and moist with no abnormal mucus.  The septum was intact, turbinates of normal size and position.  No polyps, masses, or purulence noted on examination.    ASSESSMENT:      ICD-10-CM    1. Seasonal allergic rhinitis, unspecified trigger  J30.2        Continue drops  Follow up in 1 year    Geovanna HAMEED  11:17 AM  April 6, 2022        Again, thank you for allowing me to participate in the care of your patient.        Sincerely,        Geovanna Thomas NP

## 2022-04-06 NOTE — NURSING NOTE
"Chief Complaint   Patient presents with     Follow Up     SLIT        Initial /74 (Cuff Size: Adult Regular)   Pulse 82   Temp 97.5  F (36.4  C) (Tympanic)   Ht 1.778 m (5' 10\")   Wt 71.2 kg (157 lb)   SpO2 98%   BMI 22.53 kg/m   Estimated body mass index is 22.53 kg/m  as calculated from the following:    Height as of this encounter: 1.778 m (5' 10\").    Weight as of this encounter: 71.2 kg (157 lb).  Medication Reconciliation: complete  Maribell Parish LPN    "

## 2022-04-06 NOTE — PROGRESS NOTES
Chief Complaint   Patient presents with     Follow Up     SLIT        Maco Benavidez presents for a SLIT follow up    Has been doing well with drops.  Denies oral itching/swelling  He has been using plain hortensia med sinus rinses about 1 time per day.      He is ending the antibiotics in 6-7 weeks.   Still following with Pulmonology and infectious diseases.     Has noticed improvement with symptoms  Does see relief with hortensia med sinus rinses.    No eric sinusitis  No recent illnesses or sinus infections    Epi pen is up to date    Allergies   Allergen Reactions     Aspirin      Food      Apples, nectarines......throat     Other [Seasonal Allergies]      Inhalant in type environmental     Current Outpatient Medications   Medication     albuterol (ALBUTEROL) 108 (90 BASE) MCG/ACT inhaler     azelastine (ASTELIN) 0.1 % nasal spray     azithromycin (ZITHROMAX) 500 MG tablet     cholecalciferol (VITAMIN D3) 1000 UNIT tablet     EPINEPHrine (ANY BX GENERIC EQUIV) 0.3 MG/0.3ML injection 2-pack     ethambutol (MYAMBUTOL) 400 MG tablet     fish oil-omega-3 fatty acids (FISH OIL) 1000 MG capsule     Flaxseed, Linseed, (FLAXSEED OIL) 1000 MG CAPS     fluocinonide (LIDEX) 0.05 % cream     fluticasone (FLONASE) 50 MCG/ACT nasal spray     fluticasone (FLOVENT HFA) 220 MCG/ACT inhaler     levocetirizine (XYZAL) 5 MG tablet     Lutein 6 MG CAPS     montelukast (SINGULAIR) 10 MG tablet     Multiple Vitamin (DAILY MULTIVITAMIN PO)     rifampin (RIFADIN) 300 MG capsule     SUBLINGUAL IMMUNOTHERAPY, SLIT,     warfarin (COUMADIN) 5 MG tablet     No current facility-administered medications for this visit.     Past Medical History:   Diagnosis Date     Clotting disorder (H)     Factor V Leiden, history of DVT and PE     Uncomplicated asthma      Past Surgical History:   Procedure Laterality Date     COLONOSCOPY N/A 5/4/2018    Procedure: COLONOSCOPY;  COLONOSCOPY;  Surgeon: Jc Reardon MD;  Location: [facility]     COLONOSCOPY - [facility]  "SCAN  09/13/2006    Colonoscopy, Flex, Diagnostic.  Repeat 2016;  No polyps identified.  Moderate sized hemorrhoids, No diverticular disease.  He can go ten years before his next colonoscopy     FLEXIBLE SIGMOIDOSCOPY - [facility] SCAN  09/05/1996    Sigmoidoscopy, biopsy, flex sig, with biopsy;  within normal limits     HERNIA REPAIR       Nasal Polypectomy x3       Social History     Tobacco Use     Smoking status: Never Smoker     Smokeless tobacco: Never Used   Substance Use Topics     Alcohol use: Yes     Comment: beer occasionally     Drug use: No       /74 (Cuff Size: Adult Regular)   Pulse 82   Temp 97.5  F (36.4  C) (Tympanic)   Ht 1.778 m (5' 10\")   Wt 71.2 kg (157 lb)   SpO2 98%   BMI 22.53 kg/m    General - The patient is well nourished and well developed.  Alert and oriented to person and place, answers questions and cooperates with examination appropriately.   Head and Face - Normocephalic and atraumatic, with no gross asymmetry noted.  The facial nerve is intact, with strong symmetric movements.  Voice and Breathing - The patient was breathing comfortably without the use of accessory muscles. There was no wheezing, stridor, or stertor.  The patients voice was clear and strong, and had appropriate pitch and quality.  Ears - External ears are normal in appearance. The external auditory canals are patent, the tympanic membranes are intact without effusion, retraction or mass.  Bony landmarks are intact.  Eyes - Extraocular movements intact.  Conjunctiva were not injected.  Mouth - Examination of the oral cavity showed pink, healthy oral mucosa. No lesions or ulcerations noted.  The tongue was mobile and midline, and the dentition were in good condition.    Throat - The walls of the oropharynx were smooth, pink, moist, symmetric, and had no lesions or ulcerations.  The tonsillar pillars and soft palate were symmetric.  The uvula was midline on elevation.    Neck -  Palpation of the occipital, " submental, submandibular, internal jugular chain, and supraclavicular nodes did not demonstrate any abnormal lymph nodes or masses.  Palpation of the thyroid was soft and smooth, with no nodules or goiter appreciated.  The trachea was mobile and midline.  Nose - External contour is symmetric, no gross deflection or scars.  Nasal mucosa is pink and moist with no abnormal mucus.  The septum was intact, turbinates of normal size and position.  No polyps, masses, or purulence noted on examination.    ASSESSMENT:      ICD-10-CM    1. Seasonal allergic rhinitis, unspecified trigger  J30.2        Continue drops  Follow up in 1 year    Geovanna HAMEED  11:17 AM  April 6, 2022

## 2022-05-23 DIAGNOSIS — J30.2 PERENNIAL ALLERGIC RHINITIS WITH SEASONAL VARIATION: ICD-10-CM

## 2022-05-23 DIAGNOSIS — J30.89 PERENNIAL ALLERGIC RHINITIS WITH SEASONAL VARIATION: ICD-10-CM

## 2022-05-25 RX ORDER — AZELASTINE 1 MG/ML
SPRAY, METERED NASAL
Qty: 30 ML | Refills: 0 | Status: SHIPPED | OUTPATIENT
Start: 2022-05-25 | End: 2022-10-18

## 2022-05-25 NOTE — TELEPHONE ENCOUNTER
astelin      Last Written Prescription Date:  1/17/22  Last Fill Quantity: 30 ml,   # refills: 0  Last Office Visit: 4/6/22  Future Office visit:

## 2022-06-12 ENCOUNTER — HEALTH MAINTENANCE LETTER (OUTPATIENT)
Age: 72
End: 2022-06-12

## 2022-06-14 ENCOUNTER — TELEPHONE (OUTPATIENT)
Dept: ALLERGY | Facility: OTHER | Age: 72
End: 2022-06-14
Payer: COMMERCIAL

## 2022-06-14 DIAGNOSIS — J30.2 SEASONAL ALLERGIC RHINITIS, UNSPECIFIED TRIGGER: Primary | ICD-10-CM

## 2022-06-24 ENCOUNTER — TELEPHONE (OUTPATIENT)
Dept: ALLERGY | Facility: OTHER | Age: 72
End: 2022-06-24

## 2022-06-24 NOTE — TELEPHONE ENCOUNTER
Attempted to reach patient again.  No answer or voicemail option.    Harris Gruber RN on 6/24/2022 at 2:29 PM

## 2022-06-24 NOTE — TELEPHONE ENCOUNTER
Received a refill request from [facility] requesting a refill of this patient's allergy drops.  Called patient.  No answer, or answering machine.  Will continue to try and reach him.    Harris Gruber RN on 6/24/2022 at 10:27 AM

## 2022-06-27 NOTE — TELEPHONE ENCOUNTER
Called patient again.  He denied any adverse reactions and is tolerating the drops well.  He stated that the Epi Pen was current.  No follow up needed at this time.  We will process this refill request.     Harris Gruber RN on 6/27/2022 at 9:17 AM

## 2022-09-07 NOTE — TELEPHONE ENCOUNTER
Patient needs an updated order for his allergy drops (SLIT).  New order pended for review and signature, thank you!    Harris Gruber RN on 6/14/2022 at 11:10 AM     Bill Quezada(PA)

## 2022-09-28 ENCOUNTER — TELEPHONE (OUTPATIENT)
Dept: ALLERGY | Facility: OTHER | Age: 72
End: 2022-09-28

## 2022-09-28 NOTE — TELEPHONE ENCOUNTER
Received a refill request from [facility] requesting a refill of this patient's allergy drops.  Called patient.  He denied any adverse reactions and is tolerating the drops well.  He stated that his Epi Pen was current.  No follow up needed at this time.  We will process this refill request.     Harris Gruber RN on 9/28/2022 at 2:48 PM

## 2022-10-11 DIAGNOSIS — J30.2 PERENNIAL ALLERGIC RHINITIS WITH SEASONAL VARIATION: ICD-10-CM

## 2022-10-11 DIAGNOSIS — J30.89 PERENNIAL ALLERGIC RHINITIS WITH SEASONAL VARIATION: ICD-10-CM

## 2022-10-17 NOTE — TELEPHONE ENCOUNTER
Astelin      Last Written Prescription Date:  5.25.22  Last Fill Quantity: #30mL,   # refills: 0  Last Office Visit: 4.6.22  Future Office visit:       Routing refill request to provider for review/approval because:

## 2022-10-18 RX ORDER — AZELASTINE 1 MG/ML
SPRAY, METERED NASAL
Qty: 30 ML | Refills: 0 | Status: SHIPPED | OUTPATIENT
Start: 2022-10-18 | End: 2023-03-14

## 2022-10-29 ENCOUNTER — HEALTH MAINTENANCE LETTER (OUTPATIENT)
Age: 72
End: 2022-10-29

## 2023-01-03 ENCOUNTER — TELEPHONE (OUTPATIENT)
Dept: ALLERGY | Facility: OTHER | Age: 73
End: 2023-01-03

## 2023-01-03 NOTE — TELEPHONE ENCOUNTER
Received a refill request from [facility] requesting a refill of this patient's allergy drops.  Called patient.  He denied any adverse reactions and is tolerating the drops well.  He stated that his Epi Pen was current.  No follow up needed at this time.  We will process this refill request.     Harris Gruber RN on 1/3/2023 at 3:50 PM

## 2023-02-23 DIAGNOSIS — J30.2 PERENNIAL ALLERGIC RHINITIS WITH SEASONAL VARIATION: Primary | ICD-10-CM

## 2023-02-23 DIAGNOSIS — J30.89 PERENNIAL ALLERGIC RHINITIS WITH SEASONAL VARIATION: Primary | ICD-10-CM

## 2023-02-23 NOTE — TELEPHONE ENCOUNTER
Pt Epi Pen will  at the end of Feb.  New Rx pended for review and signature.  Thank you!    Harris Gruber RN on 2023 at 2:00 PM

## 2023-02-24 RX ORDER — EPINEPHRINE 0.3 MG/.3ML
0.3 INJECTION SUBCUTANEOUS PRN
Qty: 2 EACH | Refills: 1 | Status: SHIPPED | OUTPATIENT
Start: 2023-02-24 | End: 2024-02-24

## 2023-03-10 DIAGNOSIS — J30.89 PERENNIAL ALLERGIC RHINITIS WITH SEASONAL VARIATION: ICD-10-CM

## 2023-03-10 DIAGNOSIS — J30.2 PERENNIAL ALLERGIC RHINITIS WITH SEASONAL VARIATION: ICD-10-CM

## 2023-03-13 NOTE — TELEPHONE ENCOUNTER
Astelin       Last Written Prescription Date:  10/18/22  Last Fill Quantity: 30mL,   # refills: 0  Last Office Visit: 4/6/22  Future Office visit:

## 2023-03-14 RX ORDER — AZELASTINE HYDROCHLORIDE 137 UG/1
SPRAY, METERED NASAL
Qty: 30 ML | Refills: 0 | Status: SHIPPED | OUTPATIENT
Start: 2023-03-14 | End: 2023-05-17

## 2023-03-27 DIAGNOSIS — J30.89 PERENNIAL ALLERGIC RHINITIS WITH SEASONAL VARIATION: ICD-10-CM

## 2023-03-27 DIAGNOSIS — J30.2 PERENNIAL ALLERGIC RHINITIS WITH SEASONAL VARIATION: ICD-10-CM

## 2023-03-27 DIAGNOSIS — J30.2 SEASONAL ALLERGIC RHINITIS, UNSPECIFIED TRIGGER: ICD-10-CM

## 2023-03-27 NOTE — TELEPHONE ENCOUNTER
Xyzal       Last Written Prescription Date:  3/11/22  Last Fill Quantity: 90,   # refills: 3  Last Office Visit: 4/6/22  Future Office visit:

## 2023-03-28 RX ORDER — LEVOCETIRIZINE DIHYDROCHLORIDE 5 MG/1
TABLET, FILM COATED ORAL
Qty: 90 TABLET | Refills: 0 | Status: SHIPPED | OUTPATIENT
Start: 2023-03-28 | End: 2023-05-17

## 2023-04-10 ENCOUNTER — TELEPHONE (OUTPATIENT)
Dept: ALLERGY | Facility: OTHER | Age: 73
End: 2023-04-10

## 2023-04-10 ENCOUNTER — TELEPHONE (OUTPATIENT)
Dept: OTOLARYNGOLOGY | Facility: OTHER | Age: 73
End: 2023-04-10

## 2023-04-10 NOTE — TELEPHONE ENCOUNTER
Received a refill request from [facility] requesting a refill of this patient's allergy drops.  Called patient.  He denied any adverse reactions and are tolerating the drops well.  He stated that the Epi Pen was current.  Follow up is due, will have scheduling reach out to schedule appt.     Mily Gonzalez RN on 4/10/2023 at 1:10 PM

## 2023-04-10 NOTE — TELEPHONE ENCOUNTER
Left message for patient to call the allergy department back for allergy drop refill.    Mily Gonzalez RN on 4/10/2023 at 10:35 AM

## 2023-04-11 ENCOUNTER — TRANSFERRED RECORDS (OUTPATIENT)
Dept: HEALTH INFORMATION MANAGEMENT | Facility: HOSPITAL | Age: 73
End: 2023-04-11

## 2023-05-17 ENCOUNTER — OFFICE VISIT (OUTPATIENT)
Dept: OTOLARYNGOLOGY | Facility: OTHER | Age: 73
End: 2023-05-17
Attending: PHYSICIAN ASSISTANT
Payer: COMMERCIAL

## 2023-05-17 VITALS
TEMPERATURE: 97 F | HEART RATE: 62 BPM | WEIGHT: 157 LBS | HEIGHT: 70 IN | BODY MASS INDEX: 22.48 KG/M2 | SYSTOLIC BLOOD PRESSURE: 136 MMHG | OXYGEN SATURATION: 98 % | DIASTOLIC BLOOD PRESSURE: 62 MMHG

## 2023-05-17 DIAGNOSIS — J31.0 CHRONIC RHINITIS: ICD-10-CM

## 2023-05-17 DIAGNOSIS — J33.9 NASAL POLYP: ICD-10-CM

## 2023-05-17 DIAGNOSIS — J30.2 PERENNIAL ALLERGIC RHINITIS WITH SEASONAL VARIATION: Primary | ICD-10-CM

## 2023-05-17 DIAGNOSIS — J30.2 SEASONAL ALLERGIC RHINITIS, UNSPECIFIED TRIGGER: ICD-10-CM

## 2023-05-17 DIAGNOSIS — J30.89 PERENNIAL ALLERGIC RHINITIS WITH SEASONAL VARIATION: Primary | ICD-10-CM

## 2023-05-17 PROBLEM — Z86.19 HX OF MYCOBACTERIUM AVIUM COMPLEX INFECTION: Status: ACTIVE | Noted: 2020-04-14

## 2023-05-17 PROBLEM — G47.33 OSA ON CPAP: Status: ACTIVE | Noted: 2022-06-08

## 2023-05-17 PROCEDURE — 99213 OFFICE O/P EST LOW 20 MIN: CPT | Performed by: PHYSICIAN ASSISTANT

## 2023-05-17 PROCEDURE — G0463 HOSPITAL OUTPT CLINIC VISIT: HCPCS

## 2023-05-17 RX ORDER — FLUTICASONE PROPIONATE 50 MCG
SPRAY, SUSPENSION (ML) NASAL
Qty: 16 G | Refills: 11 | Status: SHIPPED | OUTPATIENT
Start: 2023-05-17 | End: 2024-07-08

## 2023-05-17 RX ORDER — LEVOCETIRIZINE DIHYDROCHLORIDE 5 MG/1
5 TABLET, FILM COATED ORAL EVERY EVENING
Qty: 90 TABLET | Refills: 4 | Status: SHIPPED | OUTPATIENT
Start: 2023-05-17 | End: 2024-08-02

## 2023-05-17 RX ORDER — MONTELUKAST SODIUM 10 MG/1
1 TABLET ORAL AT BEDTIME
Qty: 90 TABLET | Refills: 4 | Status: SHIPPED | OUTPATIENT
Start: 2023-05-17

## 2023-05-17 RX ORDER — AZELASTINE HYDROCHLORIDE 137 UG/1
2 SPRAY, METERED NASAL DAILY
Qty: 30 ML | Refills: 11 | Status: SHIPPED | OUTPATIENT
Start: 2023-05-17

## 2023-05-17 ASSESSMENT — PAIN SCALES - GENERAL: PAINLEVEL: NO PAIN (0)

## 2023-05-17 NOTE — PATIENT INSTRUCTIONS
Continue with allergy drops.   Maintain three drops daily.   In upcoming 6-12 months consider taper/ decreasing dose to twice a day    Continue with Singulair, Xyzal  Continue with Astelin, Flonase.     Follow up in 1 year.     Thank you for allowing Kathy Perdue PA-C and our ENT team to participate in your care.  If your medications are too expensive, please give the nurse a call.  We can possibly change this medication.  If you have a scheduling or an appointment question please contact our Health Unit Coordinator at [phone], Ext. [phone].    ALL nursing questions or concerns can be directed to your ENT nurse at: [phone] [place]

## 2023-05-17 NOTE — PROGRESS NOTES
Chief Complaint   Patient presents with     Allergies     Slit follow up          Maco Benavidez presents for a SLIT follow up. He has been doing well overall the last few months.    Allergies and sinuses have been doing fairly well.     Has been doing well with drops.  Denies oral itching/swelling  He has been using plain hortensia med sinus rinses about 1 time per day.         Has noticed improvement with symptoms  Does see relief with hortensia med sinus rinses.    No eric sinusitis  No recent illnesses or sinus infections     Epi pen is up to date     He followed with pulmonology and completed treatment.  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  Past Medical History:   Diagnosis Date     Clotting disorder (H)     Factor V Leiden, history of DVT and PE     Uncomplicated asthma         Allergies   Allergen Reactions     Aspirin      Food      Apples, nectarines......throat     Other [Seasonal Allergies]      Inhalant in type environmental     Current Outpatient Medications   Medication     albuterol (ALBUTEROL) 108 (90 BASE) MCG/ACT inhaler     Azelastine HCl 137 MCG/SPRAY SOLN     azithromycin (ZITHROMAX) 500 MG tablet     cholecalciferol (VITAMIN D3) 1000 UNIT tablet     EPINEPHrine (ANY BX GENERIC EQUIV) 0.3 MG/0.3ML injection 2-pack     EPINEPHrine (ANY BX GENERIC EQUIV) 0.3 MG/0.3ML injection 2-pack     ethambutol (MYAMBUTOL) 400 MG tablet     fish oil-omega-3 fatty acids (FISH OIL) 1000 MG capsule     Flaxseed, Linseed, (FLAXSEED OIL) 1000 MG CAPS     fluocinonide (LIDEX) 0.05 % cream     fluticasone (FLONASE) 50 MCG/ACT nasal spray     fluticasone (FLOVENT HFA) 220 MCG/ACT inhaler     levocetirizine (XYZAL) 5 MG tablet     Lutein 6 MG CAPS     montelukast (SINGULAIR) 10 MG tablet     Multiple Vitamin (DAILY MULTIVITAMIN PO)     rifampin (RIFADIN) 300 MG capsule     SUBLINGUAL IMMUNOTHERAPY, SLIT,     warfarin (COUMADIN) 5 MG tablet     No  "current facility-administered medications for this visit.     ROS- SEE HPI  /62 (BP Location: Left arm, Cuff Size: Adult Regular)   Pulse 62   Temp 97  F (36.1  C) (Tympanic)   Ht 1.778 m (5' 10\")   Wt 71.2 kg (157 lb)   SpO2 98%   BMI 22.53 kg/m      General - The patient is well nourished and well developed.  Alert and oriented to person and place, answers questions and cooperates with examination appropriately.   Head and Face - Normocephalic and atraumatic, with no gross asymmetry noted.  The facial nerve is intact, with strong symmetric movements.  Voice and Breathing - The patient was breathing comfortably without the use of accessory muscles. There was no wheezing, stridor, or stertor.  The patients voice was clear and strong, and had appropriate pitch and quality.  Ears - External ears are normal in appearance. The external auditory canals are patent, the tympanic membranes are intact without effusion, retraction or mass.  Bony landmarks are intact.  Eyes - Extraocular movements intact.  Conjunctiva were not injected.  Mouth - Examination of the oral cavity showed pink, healthy oral mucosa. No lesions or ulcerations noted.  The tongue was mobile and midline, and the dentition were in good condition.    Throat - The walls of the oropharynx were smooth, pink, moist, symmetric, and had no lesions or ulcerations.  The tonsillar pillars and soft palate were symmetric.  The uvula was midline on elevation.    Neck -  Palpation of the occipital, submental, submandibular, internal jugular chain, and supraclavicular nodes did not demonstrate any abnormal lymph nodes or masses.  Palpation of the thyroid was soft and smooth, with no nodules or goiter appreciated.  The trachea was mobile and midline.  Nose - External contour is symmetric, no gross deflection or scars.  Nasal mucosa is pink and moist with no abnormal mucus.  The septum was intact, turbinates of normal size and position.  No polyps, masses, or " purulence noted on examination.     ASSESSMENT/ PLAN:    ICD-10-CM    1. Perennial allergic rhinitis with seasonal variation  J30.89 Azelastine HCl 137 MCG/SPRAY SOLN    J30.2 fluticasone (FLONASE) 50 MCG/ACT nasal spray     levocetirizine (XYZAL) 5 MG tablet     montelukast (SINGULAIR) 10 MG tablet      2. Seasonal allergic rhinitis, unspecified trigger  J30.2 levocetirizine (XYZAL) 5 MG tablet     montelukast (SINGULAIR) 10 MG tablet      3. Chronic rhinitis  J31.0 fluticasone (FLONASE) 50 MCG/ACT nasal spray      4. Nasal polyp  J33.9 fluticasone (FLONASE) 50 MCG/ACT nasal spray            Continue with allergy drops.   Maintain three drops daily.   In upcoming 6-12 months consider taper/ decreasing dose to twice a day    Continue with Singulair, Xyzal  Continue with Astelin, Flonase.     Follow up in 1 year.       Kathy Perdue PA-C  ENT  [facility], [place]

## 2023-06-24 ENCOUNTER — HEALTH MAINTENANCE LETTER (OUTPATIENT)
Age: 73
End: 2023-06-24

## 2023-07-18 ENCOUNTER — TELEPHONE (OUTPATIENT)
Dept: ALLERGY | Facility: OTHER | Age: 73
End: 2023-07-18

## 2023-07-18 NOTE — TELEPHONE ENCOUNTER
"1113: Patient called writer and requested refill for allergy drops. States epipen is good and no need for an order at this time.     1126: message left with [facility] for refill.    1132: Spoke to Tanisha at [facility], aware and will fax prescription request to us to be reviewed, signed and filled per provider.     1526: Signed prescription faxed back to [employer] at [phone] to be refilled. Call placed and spoke to the patient, aware prescription was sent in and to let staff know if he needs it resent due to not receiving and will resend. Patient asked about needing Epipen once more and states he is still good for now. Asked how he is doing with the drops and patient states, \"well.\" No further questions or concerns at completion of call.     Geovanna Mohan RN on 7/18/2023 at 3:36 PM    "

## 2023-10-23 ENCOUNTER — TELEPHONE (OUTPATIENT)
Dept: ALLERGY | Facility: OTHER | Age: 73
End: 2023-10-23

## 2023-10-23 DIAGNOSIS — J30.89 PERENNIAL ALLERGIC RHINITIS WITH SEASONAL VARIATION: Primary | ICD-10-CM

## 2023-10-23 DIAGNOSIS — J30.2 PERENNIAL ALLERGIC RHINITIS WITH SEASONAL VARIATION: Primary | ICD-10-CM

## 2023-10-23 NOTE — TELEPHONE ENCOUNTER
Patient is overdue for their Allergy drop prescription order, due 6/14/2023. Please sign if appropriate, thank you.

## 2023-10-25 ENCOUNTER — TELEPHONE (OUTPATIENT)
Dept: ALLERGY | Facility: OTHER | Age: 73
End: 2023-10-25

## 2023-10-25 NOTE — TELEPHONE ENCOUNTER
"Called and spoke to the patient, verified name and date of birth, and updated the patient about the allergy drop prescription was received, signed[facility][phone][facility] on 10/23/23 at 1645. Patient aware and asked how he was doing on the drops, patient states \"okay.\" Also asked and verified the patient's current Epipen is good and no needing a new Epipen at this time. No further questions or concerns at call completion, instructed to call if having any.   "

## 2024-02-12 ENCOUNTER — TELEPHONE (OUTPATIENT)
Dept: ALLERGY | Facility: OTHER | Age: 74
End: 2024-02-12

## 2024-02-12 ENCOUNTER — TRANSFERRED RECORDS (OUTPATIENT)
Dept: HEALTH INFORMATION MANAGEMENT | Facility: HOSPITAL | Age: 74
End: 2024-02-12

## 2024-02-12 NOTE — TELEPHONE ENCOUNTER
Call to Pt to let him know His drops will be sent for refill. Pt not home. Message left with wife to call back when he gets home.

## 2024-02-12 NOTE — TELEPHONE ENCOUNTER
Call from Pt stating his allergy drops have not arrived. Pt identifiers verified. Checked and did not see a request noted in chart. Assured Pt that I would call and get drops ordered and see about possible expedited shipping. Pt agreed with that plan. Pt stated his Epi Pen was good and denied further questions or concerns at call completion.

## 2024-02-22 ENCOUNTER — TELEPHONE (OUTPATIENT)
Dept: ALLERGY | Facility: OTHER | Age: 74
End: 2024-02-22

## 2024-02-22 NOTE — TELEPHONE ENCOUNTER
Spoke to Pt; Pt identified by name and . Informed Pt of allergy drop refill order and asked for expedited shipping. Pt acknowledged drops had shipped but not received yet. Pt indicated no further questions or concerns at call completion.

## 2024-05-21 ENCOUNTER — TELEPHONE (OUTPATIENT)
Dept: ALLERGY | Facility: OTHER | Age: 74
End: 2024-05-21

## 2024-05-21 DIAGNOSIS — J30.89 PERENNIAL ALLERGIC RHINITIS WITH SEASONAL VARIATION: Primary | ICD-10-CM

## 2024-05-21 DIAGNOSIS — J30.2 PERENNIAL ALLERGIC RHINITIS WITH SEASONAL VARIATION: Primary | ICD-10-CM

## 2024-05-21 RX ORDER — EPINEPHRINE 0.3 MG/.3ML
0.3 INJECTION SUBCUTANEOUS PRN
Qty: 2 EACH | Refills: 0 | Status: SHIPPED | OUTPATIENT
Start: 2024-05-21

## 2024-05-21 NOTE — TELEPHONE ENCOUNTER
Call from Pt. Pt verified by name and . Pt requesting refill of SLIT drops. Pt informed by writer that the refill was initiated. Pt indicated he needed a new epi pen. Writer routed order for Epi to Kathy Perdue PA-C. Pt denied further questions or concerns at call completion.

## 2024-06-13 NOTE — PROGRESS NOTES
Chief Complaint   Patient presents with    Ear Problem    Procedure    Cerumen Impaction     Ear cleaning     Patient presents for allergy follow up. He has been doing fairly well. Reports symptoms are fairly controlled, but seasonal flares due to pollen. He   No recent illnesses or sinus infections   He is breathing thru his nose well.   He uses Hortensia Med rinses PRN, daily.   He does use Xyzal, Singulair daily.      Has been doing well with drops.  Denies oral itching/swelling  He has been using plain hortensia med sinus rinses about 1 time per day.    Epi pen is up to date    He followed with pulmonology and completed treatment.  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  He reports improved and uses rescue inhaler infrequently. He does use Flovent daily, lower dose.   Hx nasal polyps and asthma with 3 distant FESS in [place] and [place] over 15-20 years ago     SNOT- 23  Most problematic are need to blow nose, runny nose, decreased sense of smell and taste, wake up tired and fatigued.  Past Medical History:   Diagnosis Date    Clotting disorder (H)     Factor V Leiden, history of DVT and PE    Uncomplicated asthma         Allergies   Allergen Reactions    Aspirin     Food      Apples, nectarines......throat    Other [Seasonal Allergies]      Inhalant in type environmental     Current Outpatient Medications   Medication Sig Dispense Refill    albuterol (ALBUTEROL) 108 (90 BASE) MCG/ACT inhaler Inhale 1 puff into the lungs as needed       Azelastine HCl 137 MCG/SPRAY SOLN [place] 2 sprays into both nostrils daily 30 mL 11    cholecalciferol (VITAMIN D3) 1000 UNIT tablet Take 1 tablet by mouth daily.      EPINEPHrine (ANY BX GENERIC EQUIV) 0.3 MG/0.3ML injection 2-pack Inject 0.3 mLs (0.3 mg) into the muscle as needed for anaphylaxis May repeat one time in 5-15 minutes if response to initial dose is inadequate. 2 each 0    fish oil-omega-3 fatty acids  "(FISH OIL) 1000 MG capsule Take 3 capsules by mouth daily.      Flaxseed, Linseed, (FLAXSEED OIL) 1000 MG CAPS Take 1 capsule by mouth 3 times daily.      fluocinonide (LIDEX) 0.05 % cream Apply topically 2 times daily Apply two times daily to eczema patches on hands and body. Not to face.      fluticasone (FLONASE) 50 MCG/ACT nasal spray INSTILL 2 SPRAYS TO EACH NOSTRIL ONCE DAILY. 16 g 11    fluticasone (FLOVENT HFA) 220 MCG/ACT inhaler Take 1 puff by mouth 2 times daily.      levocetirizine (XYZAL) 5 MG tablet Take 1 tablet (5 mg) by mouth every evening 90 tablet 4    Lutein 6 MG CAPS Take 1 capsule by mouth daily.      montelukast (SINGULAIR) 10 MG tablet Take 1 tablet (10 mg) by mouth At Bedtime 90 tablet 4    Multiple Vitamin (DAILY MULTIVITAMIN PO) Take 1 tablet by mouth daily with food.      SUBLINGUAL IMMUNOTHERAPY, SLIT, Place 1 drop under the tongue 3 times daily Continue on allergy drops (SLIT), one drop three times daily under the tongue.  Follow standard maintenance protocols. 1 vial PRN    warfarin (COUMADIN) 5 MG tablet Take 7.5 mg by mouth daily [facility]  Patient takes 3 - 5 mg tabs daily (6-4-20) 30 tablet      No current facility-administered medications for this visit.     ROS- SEE HPI  BP (!) 144/70 (BP Location: Right arm, Patient Position: Sitting, Cuff Size: Adult Regular)   Pulse 68   Temp 98.4  F (36.9  C) (Tympanic)   Resp 20   Ht 1.778 m (5' 10\")   Wt 71.2 kg (156 lb 15.5 oz)   SpO2 96%   BMI 22.52 kg/m      General - The patient is well nourished and well developed.  Alert and oriented to person and place, answers questions and cooperates with examination appropriately.   Head and Face - Normocephalic and atraumatic, with no gross asymmetry noted.  The facial nerve is intact, with strong symmetric movements.  Voice and Breathing - The patient was breathing comfortably without the use of accessory muscles. There was no wheezing, stridor, or stertor.  The patients voice " was clear and strong, and had appropriate pitch and quality.  Ears - External ears are normal in appearance. The external auditory canals are patent, the tympanic membranes are intact without effusion, retraction or mass.  Bony landmarks are intact.  Eyes - Extraocular movements intact.  Conjunctiva were not injected.  Mouth - Examination of the oral cavity showed pink, healthy oral mucosa. No lesions or ulcerations noted.  The tongue was mobile and midline, and the dentition were in good condition.    Throat - The walls of the oropharynx were smooth, pink, moist, symmetric, and had no lesions or ulcerations.  The tonsillar pillars and soft palate were symmetric.  The uvula was midline on elevation.    Neck -  Palpation of the occipital, submental, submandibular, internal jugular chain, and supraclavicular nodes did not demonstrate any abnormal lymph nodes or masses.  Palpation of the thyroid was soft and smooth, with no nodules or goiter appreciated.  The trachea was mobile and midline.  Nose - External contour is symmetric, no gross deflection or scars.  Nasal mucosa is pink and moist with no abnormal mucus.  The septum was intact, turbinates of normal size and position.  No polyps, masses, or purulence noted on examination.       To evaluate the nose and sinuses due to CRS NP I performed rigid nasal endoscopy. The nose was anesthetized with home afrin or topical lidocaine and neosynephrine in the office.    I began with the LEFT side using a 0 degree rigid nasal endoscope, and then similarly examined the RIGHT side    Findings:  Inferior turbinates:  edematous, boggy  Middle turbinate and middle meatus:  No purulence. Polypoid along MT.   Antrostomy patent, left with mild to moderate polypoid changes.   Ethmoid cavity patent, polypoid changes throughout.     Superior meatus is clear  Frontal recess clear  Sphenoethmoidal clear  Nasopharynx is clear    The patient tolerated procedure well    ASSESSMENT/ PLAN:     ICD-10-CM    1. Perennial allergic rhinitis with seasonal variation  J30.89 lidocaine 2%-oxymetazoline 0.025% nasal solution 2 spray    J30.2       2. Seasonal allergic rhinitis, unspecified trigger  J30.2 lidocaine 2%-oxymetazoline 0.025% nasal solution 2 spray      3. Nasal polyp  J33.9 lidocaine 2%-oxymetazoline 0.025% nasal solution 2 spray      4. Nasal polyposis  J33.9       5. Hx of endoscopic sinus surgery  Z98.890           Continue with sublingual immunotherapy.  New line next vial start twice daily dosing and taper off slit.    Continue with rinses daily or as needed.  Continue with current nasal sprays.  Will work on decreasing use of antihistamines and use Xyzal as needed.    We discussed Dupixent and indications.  This may be of consideration patient will contact insurance to see if coverage.  May need to consider  Dupixent my way.      Start Dupixent (dupilumab) once prior authorization completed  Treatment was discussed with today.  We discussed that this is a biologic and specifically an IL-4 receptor antagonist meaning it works entirely differently from other current medications.  Adverse reactions of therapy were discussed, including most commonly injection site reactions  Injection technique will be taught and self injections will be performed every 2 weeks.  Length of therapy was also discussed which is life long  Verbal and written education provided         Kathy Perdue PA-C  ENT  [facility], [place]

## 2024-06-14 ENCOUNTER — OFFICE VISIT (OUTPATIENT)
Dept: OTOLARYNGOLOGY | Facility: OTHER | Age: 74
End: 2024-06-14
Attending: PHYSICIAN ASSISTANT
Payer: COMMERCIAL

## 2024-06-14 VITALS
DIASTOLIC BLOOD PRESSURE: 70 MMHG | SYSTOLIC BLOOD PRESSURE: 144 MMHG | OXYGEN SATURATION: 96 % | HEART RATE: 68 BPM | BODY MASS INDEX: 22.47 KG/M2 | WEIGHT: 156.97 LBS | TEMPERATURE: 98.4 F | RESPIRATION RATE: 20 BRPM | HEIGHT: 70 IN

## 2024-06-14 DIAGNOSIS — J33.9 NASAL POLYP: ICD-10-CM

## 2024-06-14 DIAGNOSIS — J30.2 SEASONAL ALLERGIC RHINITIS, UNSPECIFIED TRIGGER: ICD-10-CM

## 2024-06-14 DIAGNOSIS — J30.2 PERENNIAL ALLERGIC RHINITIS WITH SEASONAL VARIATION: Primary | ICD-10-CM

## 2024-06-14 DIAGNOSIS — Z98.890 HX OF ENDOSCOPIC SINUS SURGERY: ICD-10-CM

## 2024-06-14 DIAGNOSIS — J33.9 NASAL POLYPOSIS: ICD-10-CM

## 2024-06-14 DIAGNOSIS — J30.89 PERENNIAL ALLERGIC RHINITIS WITH SEASONAL VARIATION: Primary | ICD-10-CM

## 2024-06-14 PROCEDURE — 99213 OFFICE O/P EST LOW 20 MIN: CPT | Mod: 25 | Performed by: PHYSICIAN ASSISTANT

## 2024-06-14 PROCEDURE — 31231 NASAL ENDOSCOPY DX: CPT | Performed by: PHYSICIAN ASSISTANT

## 2024-06-14 PROCEDURE — G0463 HOSPITAL OUTPT CLINIC VISIT: HCPCS | Mod: 25

## 2024-06-14 RX ORDER — ALFUZOSIN HYDROCHLORIDE 10 MG/1
10 TABLET, EXTENDED RELEASE ORAL
COMMUNITY
Start: 2023-11-21

## 2024-06-14 RX ORDER — SILDENAFIL 100 MG/1
TABLET, FILM COATED ORAL
COMMUNITY
Start: 2023-08-21

## 2024-06-14 RX ORDER — FLUTICASONE PROPIONATE 110 UG/1
AEROSOL, METERED RESPIRATORY (INHALATION)
COMMUNITY
Start: 2024-05-03

## 2024-06-14 RX ORDER — UREA 40 %
CREAM (GRAM) TOPICAL
COMMUNITY
Start: 2024-05-16

## 2024-06-14 RX ORDER — TACROLIMUS 1 MG/G
OINTMENT TOPICAL
COMMUNITY
Start: 2023-10-25

## 2024-06-14 RX ORDER — UREA 40 %
CREAM (GRAM) TOPICAL
COMMUNITY
Start: 2023-10-31

## 2024-06-14 RX ORDER — UREA 410 MG/G
CREAM TOPICAL
COMMUNITY
Start: 2023-10-30

## 2024-06-14 ASSESSMENT — PAIN SCALES - GENERAL: PAINLEVEL: NO PAIN (0)

## 2024-06-14 NOTE — LETTER
6/14/2024      Maco Benavidez  [address]  [place] [address]      Dear Colleague,    Thank you for referring your patient, Maco Benavidez, to the [facility] [place]. Please see a copy of my visit note below.    Chief Complaint   Patient presents with     Ear Problem     Procedure     Cerumen Impaction     Ear cleaning     Patient presents for allergy follow up. He has been doing fairly well. Reports symptoms are fairly controlled, but seasonal flares due to pollen. He   No recent illnesses or sinus infections   He is breathing thru his nose well.   He uses Hortensia Med rinses PRN, daily.   He does use Xyzal, Singulair daily.      Has been doing well with drops.  Denies oral itching/swelling  He has been using plain hortensia med sinus rinses about 1 time per day.    Epi pen is up to date    He followed with pulmonology and completed treatment.  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  He reports improved and uses rescue inhaler infrequently. He does use Flovent daily, lower dose.   Hx nasal polyps and asthma with 3 distant FESS in [place] and [place] over 15-20 years ago     SNOT- 23  Most problematic are need to blow nose, runny nose, decreased sense of smell and taste, wake up tired and fatigued.  Past Medical History:   Diagnosis Date     Clotting disorder (H)     Factor V Leiden, history of DVT and PE     Uncomplicated asthma         Allergies   Allergen Reactions     Aspirin      Food      Apples, nectarines......throat     Other [Seasonal Allergies]      Inhalant in type environmental     Current Outpatient Medications   Medication Sig Dispense Refill     albuterol (ALBUTEROL) 108 (90 BASE) MCG/ACT inhaler Inhale 1 puff into the lungs as needed        Azelastine HCl 137 MCG/SPRAY SOLN [place] 2 sprays into both nostrils daily 30 mL 11     cholecalciferol (VITAMIN D3) 1000 UNIT tablet Take 1 tablet by mouth daily.       EPINEPHrine  "(ANY BX GENERIC EQUIV) 0.3 MG/0.3ML injection 2-pack Inject 0.3 mLs (0.3 mg) into the muscle as needed for anaphylaxis May repeat one time in 5-15 minutes if response to initial dose is inadequate. 2 each 0     fish oil-omega-3 fatty acids (FISH OIL) 1000 MG capsule Take 3 capsules by mouth daily.       Flaxseed, Linseed, (FLAXSEED OIL) 1000 MG CAPS Take 1 capsule by mouth 3 times daily.       fluocinonide (LIDEX) 0.05 % cream Apply topically 2 times daily Apply two times daily to eczema patches on hands and body. Not to face.       fluticasone (FLONASE) 50 MCG/ACT nasal spray INSTILL 2 SPRAYS TO EACH NOSTRIL ONCE DAILY. 16 g 11     fluticasone (FLOVENT HFA) 220 MCG/ACT inhaler Take 1 puff by mouth 2 times daily.       levocetirizine (XYZAL) 5 MG tablet Take 1 tablet (5 mg) by mouth every evening 90 tablet 4     Lutein 6 MG CAPS Take 1 capsule by mouth daily.       montelukast (SINGULAIR) 10 MG tablet Take 1 tablet (10 mg) by mouth At Bedtime 90 tablet 4     Multiple Vitamin (DAILY MULTIVITAMIN PO) Take 1 tablet by mouth daily with food.       SUBLINGUAL IMMUNOTHERAPY, SLIT, Place 1 drop under the tongue 3 times daily Continue on allergy drops (SLIT), one drop three times daily under the tongue.  Follow standard maintenance protocols. 1 vial PRN     warfarin (COUMADIN) 5 MG tablet Take 7.5 mg by mouth daily [facility]  Patient takes 3 - 5 mg tabs daily (6-4-20) 30 tablet      No current facility-administered medications for this visit.     ROS- SEE HPI  BP (!) 144/70 (BP Location: Right arm, Patient Position: Sitting, Cuff Size: Adult Regular)   Pulse 68   Temp 98.4  F (36.9  C) (Tympanic)   Resp 20   Ht 1.778 m (5' 10\")   Wt 71.2 kg (156 lb 15.5 oz)   SpO2 96%   BMI 22.52 kg/m      General - The patient is well nourished and well developed.  Alert and oriented to person and place, answers questions and cooperates with examination appropriately.   Head and Face - Normocephalic and atraumatic, with no " gross asymmetry noted.  The facial nerve is intact, with strong symmetric movements.  Voice and Breathing - The patient was breathing comfortably without the use of accessory muscles. There was no wheezing, stridor, or stertor.  The patients voice was clear and strong, and had appropriate pitch and quality.  Ears - External ears are normal in appearance. The external auditory canals are patent, the tympanic membranes are intact without effusion, retraction or mass.  Bony landmarks are intact.  Eyes - Extraocular movements intact.  Conjunctiva were not injected.  Mouth - Examination of the oral cavity showed pink, healthy oral mucosa. No lesions or ulcerations noted.  The tongue was mobile and midline, and the dentition were in good condition.    Throat - The walls of the oropharynx were smooth, pink, moist, symmetric, and had no lesions or ulcerations.  The tonsillar pillars and soft palate were symmetric.  The uvula was midline on elevation.    Neck -  Palpation of the occipital, submental, submandibular, internal jugular chain, and supraclavicular nodes did not demonstrate any abnormal lymph nodes or masses.  Palpation of the thyroid was soft and smooth, with no nodules or goiter appreciated.  The trachea was mobile and midline.  Nose - External contour is symmetric, no gross deflection or scars.  Nasal mucosa is pink and moist with no abnormal mucus.  The septum was intact, turbinates of normal size and position.  No polyps, masses, or purulence noted on examination.       To evaluate the nose and sinuses due to CRS NP I performed rigid nasal endoscopy. The nose was anesthetized with home afrin or topical lidocaine and neosynephrine in the office.    I began with the LEFT side using a 0 degree rigid nasal endoscope, and then similarly examined the RIGHT side    Findings:  Inferior turbinates:  edematous, boggy  Middle turbinate and middle meatus:  No purulence. Polypoid along MT.   Antrostomy patent, left with  mild to moderate polypoid changes.   Ethmoid cavity patent, polypoid changes throughout.     Superior meatus is clear  Frontal recess clear  Sphenoethmoidal clear  Nasopharynx is clear    The patient tolerated procedure well    ASSESSMENT/ PLAN:    ICD-10-CM    1. Perennial allergic rhinitis with seasonal variation  J30.89 lidocaine 2%-oxymetazoline 0.025% nasal solution 2 spray    J30.2       2. Seasonal allergic rhinitis, unspecified trigger  J30.2 lidocaine 2%-oxymetazoline 0.025% nasal solution 2 spray      3. Nasal polyp  J33.9 lidocaine 2%-oxymetazoline 0.025% nasal solution 2 spray      4. Nasal polyposis  J33.9       5. Hx of endoscopic sinus surgery  Z98.890           Continue with sublingual immunotherapy.  New line next vial start twice daily dosing and taper off slit.    Continue with rinses daily or as needed.  Continue with current nasal sprays.  Will work on decreasing use of antihistamines and use Xyzal as needed.    We discussed Dupixent and indications.  This may be of consideration patient will contact insurance to see if coverage.  May need to consider  Dupixent my way.      Start Dupixent (dupilumab) once prior authorization completed  Treatment was discussed with today.  We discussed that this is a biologic and specifically an IL-4 receptor antagonist meaning it works entirely differently from other current medications.  Adverse reactions of therapy were discussed, including most commonly injection site reactions  Injection technique will be taught and self injections will be performed every 2 weeks.  Length of therapy was also discussed which is life long  Verbal and written education provided         Kathy Perdue PA-C  ENT  [facility], [place]      Again, thank you for allowing me to participate in the care of your patient.        Sincerely,        Kathy Perdue PA-C

## 2024-06-14 NOTE — PATIENT INSTRUCTIONS
Continue with Sinuses rinses daily  Continue with allergy drops, next vial decrease to twice a day  Consider Dupixent.   Consider trial of Xyzal as needed for allergy symptoms.       Thank you for allowing Kathy Perdue PA-C and our ENT team to participate in your care.  If your medications are too expensive, please give the nurse a call.  We can possibly change this medication.  If you have a scheduling or an appointment question please contact our Health Unit Coordinator at [phone], Ext. [phone].    ALL nursing questions or concerns can be directed to your ENT nurse at: [phone] [place]

## 2024-07-08 DIAGNOSIS — J33.9 NASAL POLYP: ICD-10-CM

## 2024-07-08 DIAGNOSIS — J30.2 PERENNIAL ALLERGIC RHINITIS WITH SEASONAL VARIATION: ICD-10-CM

## 2024-07-08 DIAGNOSIS — J31.0 CHRONIC RHINITIS: ICD-10-CM

## 2024-07-08 DIAGNOSIS — J30.89 PERENNIAL ALLERGIC RHINITIS WITH SEASONAL VARIATION: ICD-10-CM

## 2024-07-08 RX ORDER — FLUTICASONE PROPIONATE 50 MCG
SPRAY, SUSPENSION (ML) NASAL
Qty: 16 G | Refills: 0 | Status: SHIPPED | OUTPATIENT
Start: 2024-07-08

## 2024-07-30 DIAGNOSIS — J30.2 PERENNIAL ALLERGIC RHINITIS WITH SEASONAL VARIATION: ICD-10-CM

## 2024-07-30 DIAGNOSIS — J30.2 SEASONAL ALLERGIC RHINITIS, UNSPECIFIED TRIGGER: ICD-10-CM

## 2024-07-30 DIAGNOSIS — J30.89 PERENNIAL ALLERGIC RHINITIS WITH SEASONAL VARIATION: ICD-10-CM

## 2024-07-31 NOTE — TELEPHONE ENCOUNTER
Xyzal      Last Written Prescription Date:  5/17/23  Last Fill Quantity: 90,   # refills: 4  Last Office Visit: 6/14/24  Future Office visit:       Routing refill request to provider for review/approval because:

## 2024-08-02 RX ORDER — LEVOCETIRIZINE DIHYDROCHLORIDE 5 MG/1
5 TABLET, FILM COATED ORAL EVERY EVENING
Qty: 90 TABLET | Refills: 0 | Status: SHIPPED | OUTPATIENT
Start: 2024-08-02

## 2024-08-17 ENCOUNTER — HEALTH MAINTENANCE LETTER (OUTPATIENT)
Age: 74
End: 2024-08-17

## 2024-09-23 ENCOUNTER — TELEPHONE (OUTPATIENT)
Dept: ALLERGY | Facility: OTHER | Age: 74
End: 2024-09-23

## 2024-09-23 DIAGNOSIS — J30.2 PERENNIAL ALLERGIC RHINITIS WITH SEASONAL VARIATION: Primary | ICD-10-CM

## 2024-09-23 DIAGNOSIS — J30.89 PERENNIAL ALLERGIC RHINITIS WITH SEASONAL VARIATION: Primary | ICD-10-CM

## 2024-09-23 NOTE — TELEPHONE ENCOUNTER
Call to patient. Patients wife answered the phone and verified last name and . Let wife know that his allergy drop refill has been initiated. Patient will call if a refill is needed on the Epipen. No questions at call completion.

## 2025-01-09 DIAGNOSIS — J30.2 PERENNIAL ALLERGIC RHINITIS WITH SEASONAL VARIATION: ICD-10-CM

## 2025-01-09 DIAGNOSIS — J33.9 NASAL POLYP: ICD-10-CM

## 2025-01-09 DIAGNOSIS — J30.89 PERENNIAL ALLERGIC RHINITIS WITH SEASONAL VARIATION: ICD-10-CM

## 2025-01-09 DIAGNOSIS — J31.0 CHRONIC RHINITIS: ICD-10-CM

## 2025-01-09 RX ORDER — FLUTICASONE PROPIONATE 50 MCG
SPRAY, SUSPENSION (ML) NASAL
Qty: 16 G | Refills: 0 | Status: SHIPPED | OUTPATIENT
Start: 2025-01-09

## 2025-01-28 ENCOUNTER — OFFICE VISIT (OUTPATIENT)
Dept: OTOLARYNGOLOGY | Facility: OTHER | Age: 75
End: 2025-01-28
Attending: NURSE PRACTITIONER
Payer: COMMERCIAL

## 2025-01-28 VITALS
TEMPERATURE: 98.2 F | HEIGHT: 70 IN | HEART RATE: 63 BPM | DIASTOLIC BLOOD PRESSURE: 68 MMHG | SYSTOLIC BLOOD PRESSURE: 143 MMHG | BODY MASS INDEX: 24.34 KG/M2 | OXYGEN SATURATION: 98 % | WEIGHT: 170 LBS | RESPIRATION RATE: 16 BRPM

## 2025-01-28 DIAGNOSIS — J33.1 POLYPOID SINUS DEGENERATION: Primary | ICD-10-CM

## 2025-01-28 DIAGNOSIS — J45.40 MODERATE PERSISTENT ASTHMA, UNSPECIFIED WHETHER COMPLICATED: ICD-10-CM

## 2025-01-28 DIAGNOSIS — J33.9 NASAL POLYPOSIS: ICD-10-CM

## 2025-01-28 DIAGNOSIS — Z98.890 HX OF NASAL POLYPECTOMY: ICD-10-CM

## 2025-01-28 DIAGNOSIS — Z87.09 HX OF NASAL POLYPECTOMY: ICD-10-CM

## 2025-01-28 DIAGNOSIS — Z98.890 HX OF ENDOSCOPIC SINUS SURGERY: ICD-10-CM

## 2025-01-28 DIAGNOSIS — L20.9 ATOPIC DERMATITIS, UNSPECIFIED TYPE: ICD-10-CM

## 2025-01-28 PROCEDURE — 31231 NASAL ENDOSCOPY DX: CPT | Performed by: NURSE PRACTITIONER

## 2025-01-28 PROCEDURE — G0463 HOSPITAL OUTPT CLINIC VISIT: HCPCS | Mod: 25

## 2025-01-28 PROCEDURE — 99213 OFFICE O/P EST LOW 20 MIN: CPT | Mod: 25 | Performed by: NURSE PRACTITIONER

## 2025-01-28 ASSESSMENT — PAIN SCALES - GENERAL: PAINLEVEL_OUTOF10: NO PAIN (0)

## 2025-01-28 NOTE — LETTER
1/28/2025      Maco Benavidez  [address]  [place] [address]      Dear Colleague,    Thank you for referring your patient, Maco Benavidez, to the [facility] [place]. Please see a copy of my visit note below.    Otolaryngology Note         Chief Complaint:     Patient presents with:  Consult: Dupixent            History of Present Illness:     Maco Benavidez is a 74 year old male seen today for follow up chronic sinusitis.  Well-known to our ENT and allergy department.  With a history of recurrent nasal polyps, status post 3 sinus surgeries with polypectomy and additional nasal polypectomy in the office in the past.    He was last seen in ENT on 6/14/2024 by Kathy HAMMER.  At that time nasal endoscopic evaluation showed an edematous and boggy inferior turbinates, polypoid degeneration along the middle turbinate, left antrostomy patent with mild polypoid changes, ethmoid cavity with polypoid changes throughout.  It was recommended that he consider starting Dupixent.  He has since discussed with his dermatologist and his pulmonologist who both agree.    History of eosinophilic asthma, follows with pulmonology.  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  Currently on Flovent  Asthma control score  In the past 4 weeks, home much of the time did your asthma keep you from getting as much done at work, school or at home? 5 None of the time  During the past 4 weeks, how often have you had shortness of breath? 4 Once or twice a week  During the past 4 weeks, how often did your asthma symptoms (wheezing, coughing, shortness of breath, chest tightness or pain) wake you up at night or earlier than usual in the morning? 5 Not at all  During the past 4 weeks, how often have you used your rescue inhaler or nebulizer medication (such as albuterol)? 5 Not at all  How would you rate your asthma control during the past 4 weeks? 4 Well controlled  Total Score: 23    History of  atopic eczema, currently following with dermatology.  No previous Biologics, he is currently being treated with Vanicream, Protopic and Lidex.  Dermatology had recommended starting Dupixent for persistent eczema.    History of perennial allergic rhinitis, currently on sublingual immunotherapy and weaning down  MQT completed 5/11/18:  Dilution #6- Ragweed, grass, birch, cat, dust.   Dilution #5- maple, oak, anthony, cottonwood, walnut.   Dilution #2- pigweed, thistle, elm, pine, molds, dog    Today he reports recurrent nasal congestion, worse in the morning.  Has been rinsing with NeilMed sinus rinses, has been using Flonase chronically for years.  He has used daily budesonide rinses in the past without significant improvement.  He is also used long-term Astelin nasal spray with continued recurrence of polyps.         Medications:     Current Outpatient Rx   Medication Sig Dispense Refill     albuterol (ALBUTEROL) 108 (90 BASE) MCG/ACT inhaler Inhale 1 puff into the lungs as needed        alfuzosin ER (UROXATRAL) 10 MG 24 hr tablet Take 10 mg by mouth       ATORVASTATIN CALCIUM PO Take 1 tablet by mouth daily       azelastine (ASTELIN) 0.1 % nasal spray USE 2 SPRAYS IN EACH NOSTRIL ONCE DAILY. 30 mL 0     cholecalciferol (VITAMIN D3) 1000 UNIT tablet Take 1 tablet by mouth daily.       fish oil-omega-3 fatty acids (FISH OIL) 1000 MG capsule Take 3 capsules by mouth daily.       Flaxseed, Linseed, (FLAXSEED OIL) 1000 MG CAPS Take 1 capsule by mouth 3 times daily.       fluocinonide (LIDEX) 0.05 % cream Apply topically 2 times daily Apply two times daily to eczema patches on hands and body. Not to face.       fluticasone (FLONASE) 50 MCG/ACT nasal spray INSTILL 2 SPRAYS TO EACH NOSTRIL ONCE DAILY. 16 g 0     fluticasone (FLOVENT HFA) 110 MCG/ACT inhaler        levocetirizine (XYZAL) 5 MG tablet TAKE 1 TABLET BY MOUTH EVERY EVENING 90 tablet 0     Lutein 6 MG CAPS Take 1 capsule by mouth daily.       montelukast (SINGULAIR)  10 MG tablet Take 1 tablet (10 mg) by mouth At Bedtime 90 tablet 4     Multiple Vitamin (DAILY MULTIVITAMIN PO) Take 1 tablet by mouth daily with food.       sildenafil (VIAGRA) 100 MG tablet TAKE 1/2 TO 1 TABLET BY MOUTH ONE TIME AS NEEDED FOR ERECTILE DYSFUNCTION UP TO 1 DOSE. TAKE ORALLY 30 MINUTES TO 4 HRS BEFORE SEXUAL ACTIVITY       SUBLINGUAL IMMUNOTHERAPY, SLIT, Place 1 drop under the tongue 3 times daily Continue on allergy drops (SLIT), one drop three times daily under the tongue.  Follow standard maintenance protocols. 1 vial PRN     tacrolimus (PROTOPIC) 0.1 % external ointment APPLY TOPICALLY TWICE DAILY TO FACE AND BODY FOR ECZEMA AS NEEDED       Urea 40 % CREA        warfarin (COUMADIN) 5 MG tablet Take 7.5 mg by mouth daily Per [facility]  Patient takes 3 - 5 mg tabs daily (6-4-20) 30 tablet      EPINEPHrine (ANY BX GENERIC EQUIV) 0.3 MG/0.3ML injection 2-pack Inject 0.3 mLs (0.3 mg) into the muscle as needed for anaphylaxis May repeat one time in 5-15 minutes if response to initial dose is inadequate. (Patient not taking: Reported on 1/28/2025) 2 each 0     fluticasone (FLOVENT HFA) 220 MCG/ACT inhaler Take 1 puff by mouth 2 times daily. (Patient not taking: Reported on 1/28/2025)       SUBLINGUAL IMMUNOTHERAPY, SLIT, Place 1 drop under the tongue 3 times daily. (Patient not taking: Reported on 1/28/2025)       Urea 40 % CREA Apply  topically one time a day. Apply to affected area: both feet (Patient not taking: Reported on 1/28/2025)       Urea 41 % CREA Apply  topically. (Patient not taking: Reported on 1/28/2025)              Allergies:     Allergies: Aspirin, Food, and Other [seasonal allergies]          Past Medical History:     Past Medical History:   Diagnosis Date     Clotting disorder     Factor V Leiden, history of DVT and PE     Uncomplicated asthma             Past Surgical History:     Past Surgical History:   Procedure Laterality Date     COLONOSCOPY N/A 5/4/2018    Procedure:  "COLONOSCOPY;  COLONOSCOPY;  Surgeon: Jc Reardon MD;  Location: [facility] OR     COLONOSCOPY - [facility] SCAN  09/13/2006    Colonoscopy, Flex, Diagnostic.  Repeat 2016;  No polyps identified.  Moderate sized hemorrhoids, No diverticular disease.  He can go ten years before his next colonoscopy     FLEXIBLE SIGMOIDOSCOPY - [facility] SCAN  09/05/1996    Sigmoidoscopy, biopsy, flex sig, with biopsy;  within normal limits     HERNIA REPAIR       Nasal Polypectomy x3         ENT family history reviewed         Social History:     Social History     Tobacco Use     Smoking status: Never     Smokeless tobacco: Never   Substance Use Topics     Alcohol use: Yes     Comment: beer occasionally     Drug use: No            Review of Systems:     ROS: See HPI         Physical Exam:     BP (!) 143/68 (BP Location: Right arm, Patient Position: Sitting, Cuff Size: Adult Regular)   Pulse 63   Temp 98.2  F (36.8  C) (Tympanic)   Resp 16   Ht 1.778 m (5' 10\")   Wt 77.1 kg (170 lb)   SpO2 98%   BMI 24.39 kg/m      General - The patient is well nourished and well developed, and appears to have good nutritional status.  Alert and oriented to person and place, answers questions and cooperates with examination appropriately.   Head and Face - Normocephalic and atraumatic, with no gross asymmetry noted.  The facial nerve is intact, with strong symmetric movements.  Voice and Breathing - The patient was breathing comfortably without the use of accessory muscles. There was no wheezing, stridor. The patients voice was clear and strong, and had appropriate pitch and quality.  Ears - External ear normal. Canals are patent. Right tympanic membrane is intact without effusion, retraction or mass. Left tympanic membrane is intact without effusion, retraction or mass.  Eyes - Extraocular movements intact, sclera were not icteric or injected.  Mouth - Examination of the oral cavity showed pink, healthy oral mucosa. Dentition in good condition. No lesions " or ulcerations noted. The tongue was mobile and midline.   Throat - The walls of the oropharynx were smooth, pink, moist, symmetric, and had no lesions or ulcerations.  The tonsillar pillars and soft palate were symmetric. The uvula was midline on elevation.    Neck - Normal midline excursion of the laryngotracheal complex during swallowing.  Full range of motion on passive movement.  Palpation of the occipital, submental, submandibular, internal jugular chain, and supraclavicular nodes did not demonstrate any abnormal lymph nodes or masses.  Palpation of the thyroid was soft and smooth, with no nodules or goiter appreciated.  The trachea was mobile and midline.  Nose - External contour is symmetric, no gross deflection or scars.  Nasal mucosa is pink and moist with no abnormal mucus.      To evaluate the nose and sinuses, I performed rigid nasal endoscopy.  I sprayed both nares with 2 sprays lidocaine and neosynephrine.     I began with the RIGHT side using a 0 degree rigid nasal endoscope, and then similarly examined the LEFT side     Findings:  Inferior turbinates: Edematous bilaterally, lateralized  Middle turbinate and middle meatus: Polypoid degeneration  The superior meatus is examined and unremarkable  Antrostomy patent with polypoid degeneration  Ethmoid cavity patent with polypoid degeneration  Sphenoethmoidal recesses examined  Nasopharynx clear, ET patent, no edema  The patient tolerated the procedure well            Assessment and Plan:       ICD-10-CM    1. Polypoid sinus degeneration  J33.1       2. Nasal polyposis  J33.9       3. Hx of endoscopic sinus surgery  Z98.890       4. Hx of nasal polypectomy  Z98.890     Z87.09     FESS x 3 with additional in office polypectomy      5. Moderate persistent asthma, unspecified whether complicated  J45.40       6. Atopic dermatitis, unspecified type  L20.9         PA submitted for dupixent    Start Dupixent (dupilumab) once prior authorization  completed  Treatment was discussed with today.  We will see him back in clinic for his first injection once available  Continue SLIT  We discussed that this is a biologic and specifically a dual inhibitor of IL-4 and IL-13.  It works entirely differently from other current medications.  Adverse reactions of therapy were discussed, including most commonly injection site reactions  Injection technique will be taught and self injections will be performed every 2 weeks.  Length of therapy was also discussed which is life long  Verbal and written education provided  Continue budesonide rinses, Flonase and Astelin  Continue steroid inhaler for asthma  Follow-up in 6 to 12 months for recheck    Geovanna HAMEED  [facility]      Again, thank you for allowing me to participate in the care of your patient.        Sincerely,        Geovanna Thomas NP    Electronically signed

## 2025-01-28 NOTE — PATIENT INSTRUCTIONS
Nurse will submit a prior authorization for Dupixent.      Follow up in 6-12 months with ENT Geovanna Thomas     Thank you for allowing Geovanna Thomas and our ENT team to participate in your care.  If your medications are too expensive, please give the nurse a call.  We can possibly change this medication.  If you have a scheduling or an appointment question please contact our Health Unit Coordinator at their direct line [phone] ext [phone].   ALL nursing questions or concerns can be directed to your ENT nurse at: [phone] - ann

## 2025-01-28 NOTE — PROGRESS NOTES
Otolaryngology Note         Chief Complaint:     Patient presents with:  Consult: Dupixent            History of Present Illness:     Maco Benavidez is a 74 year old male seen today for follow up chronic sinusitis.  Well-known to our ENT and allergy department.  With a history of recurrent nasal polyps, status post 3 sinus surgeries with polypectomy and additional nasal polypectomy in the office in the past.    He was last seen in ENT on 6/14/2024 by Kathy HAMMER.  At that time nasal endoscopic evaluation showed an edematous and boggy inferior turbinates, polypoid degeneration along the middle turbinate, left antrostomy patent with mild polypoid changes, ethmoid cavity with polypoid changes throughout.  It was recommended that he consider starting Dupixent.  He has since discussed with his dermatologist and his pulmonologist who both agree.    History of eosinophilic asthma, follows with pulmonology.  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  Currently on Flovent  Asthma control score  In the past 4 weeks, home much of the time did your asthma keep you from getting as much done at work, school or at home? 5 None of the time  During the past 4 weeks, how often have you had shortness of breath? 4 Once or twice a week  During the past 4 weeks, how often did your asthma symptoms (wheezing, coughing, shortness of breath, chest tightness or pain) wake you up at night or earlier than usual in the morning? 5 Not at all  During the past 4 weeks, how often have you used your rescue inhaler or nebulizer medication (such as albuterol)? 5 Not at all  How would you rate your asthma control during the past 4 weeks? 4 Well controlled  Total Score: 23    History of atopic eczema, currently following with dermatology.  No previous Biologics, he is currently being treated with Vanicream, Protopic and Lidex.  Dermatology had recommended starting Dupixent for persistent eczema.    History of  perennial allergic rhinitis, currently on sublingual immunotherapy and weaning down  MQT completed 5/11/18:  Dilution #6- Ragweed, grass, birch, cat, dust.   Dilution #5- maple, oak, anthony, cottonwood, walnut.   Dilution #2- pigweed, thistle, elm, pine, molds, dog    Today he reports recurrent nasal congestion, worse in the morning.  Has been rinsing with NeilMed sinus rinses, has been using Flonase chronically for years.  He has used daily budesonide rinses in the past without significant improvement.  He is also used long-term Astelin nasal spray with continued recurrence of polyps.         Medications:     Current Outpatient Rx   Medication Sig Dispense Refill    albuterol (ALBUTEROL) 108 (90 BASE) MCG/ACT inhaler Inhale 1 puff into the lungs as needed       alfuzosin ER (UROXATRAL) 10 MG 24 hr tablet Take 10 mg by mouth      ATORVASTATIN CALCIUM PO Take 1 tablet by mouth daily      azelastine (ASTELIN) 0.1 % nasal spray USE 2 SPRAYS IN EACH NOSTRIL ONCE DAILY. 30 mL 0    cholecalciferol (VITAMIN D3) 1000 UNIT tablet Take 1 tablet by mouth daily.      fish oil-omega-3 fatty acids (FISH OIL) 1000 MG capsule Take 3 capsules by mouth daily.      Flaxseed, Linseed, (FLAXSEED OIL) 1000 MG CAPS Take 1 capsule by mouth 3 times daily.      fluocinonide (LIDEX) 0.05 % cream Apply topically 2 times daily Apply two times daily to eczema patches on hands and body. Not to face.      fluticasone (FLONASE) 50 MCG/ACT nasal spray INSTILL 2 SPRAYS TO EACH NOSTRIL ONCE DAILY. 16 g 0    fluticasone (FLOVENT HFA) 110 MCG/ACT inhaler       levocetirizine (XYZAL) 5 MG tablet TAKE 1 TABLET BY MOUTH EVERY EVENING 90 tablet 0    Lutein 6 MG CAPS Take 1 capsule by mouth daily.      montelukast (SINGULAIR) 10 MG tablet Take 1 tablet (10 mg) by mouth At Bedtime 90 tablet 4    Multiple Vitamin (DAILY MULTIVITAMIN PO) Take 1 tablet by mouth daily with food.      sildenafil (VIAGRA) 100 MG tablet TAKE 1/2 TO 1 TABLET BY MOUTH ONE TIME AS NEEDED  FOR ERECTILE DYSFUNCTION UP TO 1 DOSE. TAKE ORALLY 30 MINUTES TO 4 HRS BEFORE SEXUAL ACTIVITY      SUBLINGUAL IMMUNOTHERAPY, SLIT, Place 1 drop under the tongue 3 times daily Continue on allergy drops (SLIT), one drop three times daily under the tongue.  Follow standard maintenance protocols. 1 vial PRN    tacrolimus (PROTOPIC) 0.1 % external ointment APPLY TOPICALLY TWICE DAILY TO FACE AND BODY FOR ECZEMA AS NEEDED      Urea 40 % CREA       warfarin (COUMADIN) 5 MG tablet Take 7.5 mg by mouth daily [facility]  Patient takes 3 - 5 mg tabs daily (6-4-20) 30 tablet     EPINEPHrine (ANY BX GENERIC EQUIV) 0.3 MG/0.3ML injection 2-pack Inject 0.3 mLs (0.3 mg) into the muscle as needed for anaphylaxis May repeat one time in 5-15 minutes if response to initial dose is inadequate. (Patient not taking: Reported on 1/28/2025) 2 each 0    fluticasone (FLOVENT HFA) 220 MCG/ACT inhaler Take 1 puff by mouth 2 times daily. (Patient not taking: Reported on 1/28/2025)      SUBLINGUAL IMMUNOTHERAPY, SLIT, Place 1 drop under the tongue 3 times daily. (Patient not taking: Reported on 1/28/2025)      Urea 40 % CREA Apply  topically one time a day. Apply to affected area: both feet (Patient not taking: Reported on 1/28/2025)      Urea 41 % CREA Apply  topically. (Patient not taking: Reported on 1/28/2025)              Allergies:     Allergies: Aspirin, Food, and Other [seasonal allergies]          Past Medical History:     Past Medical History:   Diagnosis Date    Clotting disorder     Factor V Leiden, history of DVT and PE    Uncomplicated asthma             Past Surgical History:     Past Surgical History:   Procedure Laterality Date    COLONOSCOPY N/A 5/4/2018    Procedure: COLONOSCOPY;  COLONOSCOPY;  Surgeon: Jc Reardon MD;  Location: [facility] OR    COLONOSCOPY - [facility] SCAN  09/13/2006    Colonoscopy, Flex, Diagnostic.  Repeat 2016;  No polyps identified.  Moderate sized hemorrhoids, No diverticular disease.  He can go ten  "years before his next colonoscopy    FLEXIBLE SIGMOIDOSCOPY - [facility] SCAN  09/05/1996    Sigmoidoscopy, biopsy, flex sig, with biopsy;  within normal limits    HERNIA REPAIR      Nasal Polypectomy x3         ENT family history reviewed         Social History:     Social History     Tobacco Use    Smoking status: Never    Smokeless tobacco: Never   Substance Use Topics    Alcohol use: Yes     Comment: beer occasionally    Drug use: No            Review of Systems:     ROS: See HPI         Physical Exam:     BP (!) 143/68 (BP Location: Right arm, Patient Position: Sitting, Cuff Size: Adult Regular)   Pulse 63   Temp 98.2  F (36.8  C) (Tympanic)   Resp 16   Ht 1.778 m (5' 10\")   Wt 77.1 kg (170 lb)   SpO2 98%   BMI 24.39 kg/m      General - The patient is well nourished and well developed, and appears to have good nutritional status.  Alert and oriented to person and place, answers questions and cooperates with examination appropriately.   Head and Face - Normocephalic and atraumatic, with no gross asymmetry noted.  The facial nerve is intact, with strong symmetric movements.  Voice and Breathing - The patient was breathing comfortably without the use of accessory muscles. There was no wheezing, stridor. The patients voice was clear and strong, and had appropriate pitch and quality.  Ears - External ear normal. Canals are patent. Right tympanic membrane is intact without effusion, retraction or mass. Left tympanic membrane is intact without effusion, retraction or mass.  Eyes - Extraocular movements intact, sclera were not icteric or injected.  Mouth - Examination of the oral cavity showed pink, healthy oral mucosa. Dentition in good condition. No lesions or ulcerations noted. The tongue was mobile and midline.   Throat - The walls of the oropharynx were smooth, pink, moist, symmetric, and had no lesions or ulcerations.  The tonsillar pillars and soft palate were symmetric. The uvula was midline on elevation.  "   Neck - Normal midline excursion of the laryngotracheal complex during swallowing.  Full range of motion on passive movement.  Palpation of the occipital, submental, submandibular, internal jugular chain, and supraclavicular nodes did not demonstrate any abnormal lymph nodes or masses.  Palpation of the thyroid was soft and smooth, with no nodules or goiter appreciated.  The trachea was mobile and midline.  Nose - External contour is symmetric, no gross deflection or scars.  Nasal mucosa is pink and moist with no abnormal mucus.      To evaluate the nose and sinuses, I performed rigid nasal endoscopy.  I sprayed both nares with 2 sprays lidocaine and neosynephrine.     I began with the RIGHT side using a 0 degree rigid nasal endoscope, and then similarly examined the LEFT side     Findings:  Inferior turbinates: Edematous bilaterally, lateralized  Middle turbinate and middle meatus: Polypoid degeneration  The superior meatus is examined and unremarkable  Antrostomy patent with polypoid degeneration  Ethmoid cavity patent with polypoid degeneration  Sphenoethmoidal recesses examined  Nasopharynx clear, ET patent, no edema  The patient tolerated the procedure well            Assessment and Plan:       ICD-10-CM    1. Polypoid sinus degeneration  J33.1       2. Nasal polyposis  J33.9       3. Hx of endoscopic sinus surgery  Z98.890       4. Hx of nasal polypectomy  Z98.890     Z87.09     FESS x 3 with additional in office polypectomy      5. Moderate persistent asthma, unspecified whether complicated  J45.40       6. Atopic dermatitis, unspecified type  L20.9         PA submitted for dupixent    Start Dupixent (dupilumab) once prior authorization completed  Treatment was discussed with today.  We will see him back in clinic for his first injection once available  Continue SLIT  We discussed that this is a biologic and specifically a dual inhibitor of IL-4 and IL-13.  It works entirely differently from other current  medications.  Adverse reactions of therapy were discussed, including most commonly injection site reactions  Injection technique will be taught and self injections will be performed every 2 weeks.  Length of therapy was also discussed which is life long  Verbal and written education provided  Continue budesonide rinses, Flonase and Astelin  Continue steroid inhaler for asthma  Follow-up in 6 to 12 months for recheck    Geovanna HAMEED  [facility]

## 2025-02-05 ENCOUNTER — TELEPHONE (OUTPATIENT)
Dept: OTOLARYNGOLOGY | Facility: OTHER | Age: 75
End: 2025-02-05

## 2025-02-05 NOTE — TELEPHONE ENCOUNTER
Received PA request regarding dupilumab (DUPIXENT) 300 MG/2ML prefilled pen. Submitted via EPIC, awaiting response

## 2025-02-06 ENCOUNTER — TELEPHONE (OUTPATIENT)
Dept: OTOLARYNGOLOGY | Facility: OTHER | Age: 75
End: 2025-02-06

## 2025-06-02 DIAGNOSIS — J31.0 CHRONIC RHINITIS: ICD-10-CM

## 2025-06-02 DIAGNOSIS — J33.9 NASAL POLYP: ICD-10-CM

## 2025-06-02 DIAGNOSIS — J30.2 PERENNIAL ALLERGIC RHINITIS WITH SEASONAL VARIATION: ICD-10-CM

## 2025-06-02 DIAGNOSIS — J30.89 PERENNIAL ALLERGIC RHINITIS WITH SEASONAL VARIATION: ICD-10-CM

## 2025-06-03 NOTE — TELEPHONE ENCOUNTER
Flonase      Last Written Prescription Date:  1/9/25  Last Fill Quantity: 16g,   # refills: 0  Last Office Visit: 1/28/25  Future Office visit:    Next 5 appointments (look out 90 days)      Jul 29, 2025 11:00 AM  (Arrive by 10:45 AM)  Return Visit with Geovanna Thomas NP  [facility] [place] ([facility] [place] ) [address]  [place] [address]  [phone]             Routing refill request to provider for review/approval because:

## 2025-06-04 RX ORDER — FLUTICASONE PROPIONATE 50 MCG
SPRAY, SUSPENSION (ML) NASAL
Qty: 16 G | Refills: 0 | Status: SHIPPED | OUTPATIENT
Start: 2025-06-04

## (undated) DEVICE — CANISTER-SUCTION 2000CC

## (undated) DEVICE — TUBING-SUCTION 20FT

## (undated) DEVICE — IRRIGATION-H2O 1000ML

## (undated) DEVICE — SENSOR-OXISENSOR II ADULT

## (undated) RX ORDER — PROPOFOL 10 MG/ML
INJECTION, EMULSION INTRAVENOUS
Status: DISPENSED
Start: 2018-05-04

## (undated) RX ORDER — LIDOCAINE HYDROCHLORIDE 20 MG/ML
INJECTION, SOLUTION EPIDURAL; INFILTRATION; INTRACAUDAL; PERINEURAL
Status: DISPENSED
Start: 2018-05-04